# Patient Record
Sex: MALE | Race: WHITE | NOT HISPANIC OR LATINO | Employment: FULL TIME | RURAL
[De-identification: names, ages, dates, MRNs, and addresses within clinical notes are randomized per-mention and may not be internally consistent; named-entity substitution may affect disease eponyms.]

---

## 2022-04-25 ENCOUNTER — OFFICE VISIT (OUTPATIENT)
Dept: FAMILY MEDICINE CLINIC | Facility: CLINIC | Age: 52
End: 2022-04-25

## 2022-04-25 VITALS
HEIGHT: 68 IN | BODY MASS INDEX: 32.34 KG/M2 | SYSTOLIC BLOOD PRESSURE: 138 MMHG | DIASTOLIC BLOOD PRESSURE: 100 MMHG | TEMPERATURE: 98 F | WEIGHT: 213.4 LBS | RESPIRATION RATE: 14 BRPM | OXYGEN SATURATION: 99 % | HEART RATE: 79 BPM

## 2022-04-25 DIAGNOSIS — Z13.220 SCREENING FOR CHOLESTEROL LEVEL: ICD-10-CM

## 2022-04-25 DIAGNOSIS — Z76.89 ENCOUNTER TO ESTABLISH CARE WITH NEW DOCTOR: Primary | ICD-10-CM

## 2022-04-25 DIAGNOSIS — Z82.49 FAMILY HISTORY OF EARLY CAD: ICD-10-CM

## 2022-04-25 DIAGNOSIS — F17.210 CIGARETTE SMOKER: ICD-10-CM

## 2022-04-25 DIAGNOSIS — R03.0 ELEVATED BP WITHOUT DIAGNOSIS OF HYPERTENSION: ICD-10-CM

## 2022-04-25 DIAGNOSIS — Z13.1 SCREENING FOR DIABETES MELLITUS: ICD-10-CM

## 2022-04-25 DIAGNOSIS — M25.551 RIGHT HIP PAIN: ICD-10-CM

## 2022-04-25 DIAGNOSIS — Z13.29 SCREENING FOR THYROID DISORDER: ICD-10-CM

## 2022-04-25 DIAGNOSIS — Z11.59 ENCOUNTER FOR HEPATITIS C SCREENING TEST FOR LOW RISK PATIENT: ICD-10-CM

## 2022-04-25 PROCEDURE — 99204 OFFICE O/P NEW MOD 45 MIN: CPT | Performed by: FAMILY MEDICINE

## 2022-04-25 RX ORDER — BUPROPION HYDROCHLORIDE 150 MG/1
150 TABLET ORAL DAILY
Qty: 30 TABLET | Refills: 12 | Status: SHIPPED | OUTPATIENT
Start: 2022-04-25 | End: 2022-05-27 | Stop reason: SDUPTHER

## 2022-04-25 NOTE — PROGRESS NOTES
"Vicki Rodgers is a 52 y.o. male.     No chief complaint on file.      {The patient is here:98997}.  Last comprehensive physical was on ***.  {Previous Physican 2:60815}  {Overall has :40787}. {Nutrition :35727}. Last tetanus shot was {Date Range:65675}. {Optional Exam Choices:34356}    History of Present Illness     Recent Hospitalizations:  {Hospitalization history:[id number]::\"No hospitalization(s) within the last year.\"}.  ccc      I personally reviewed and updated the patient's allergies, medications, problem list, and past medical, surgical, social, and family history. I have reviewed and confirmed the accuracy of the HPI and ROS as documented by the MA/FLAKITO/ROMEL Helms MA    No family history on file.         No past surgical history on file.    There is no problem list on file for this patient.      No current outpatient medications on file.    Review of Systems    I have reviewed and confirmed the accuracy of the ROS as documented by the MA/FLAKITO/ROMEL Helms MA      Objective   There were no vitals taken for this visit.  BP Readings from Last 3 Encounters:   No data found for BP     Wt Readings from Last 3 Encounters:   No data found for Wt     Physical Exam    Data / Lab Results:    No results found for: HGBA1C     No results found for: LDL, LDLDIRECT  No results found for: CHOL  No results found for: TRIG  No results found for: HDL  No results found for: PSA  No results found for: WBC, HGB, HCT, MCV, PLT  No results found for: TSH, A4GNLWE, O7ECYJS   No results found for: GLUCOSE, BUN, CREATININE, EGFRIFNONA, EGFRIFAFRI, BCR, K, CO2, CALCIUM, PROTENTOTREF, ALBUMIN, LABIL2, BILIRUBIN, AST, ALT  No results found for: KIRSTIN, RF, SEDRATE   No results found for: CRP   No results found for: IRON, TIBC, FERRITIN   No results found for: [id number]     Age-appropriate Screening Schedule:  Refer to the list below for future screening recommendations based on patient's age, sex and/or medical " conditions. Orders for these recommended tests are listed in the plan section. The patient has been provided with a written plan.    Health Maintenance   Topic Date Due   • TDAP/TD VACCINES (1 - Tdap) Never done   • ZOSTER VACCINE (1 of 2) Never done   • INFLUENZA VACCINE  08/01/2022           Assessment/Plan      Medications        Problem List         LOS        There are no diagnoses linked to this encounter.          Expected course, medications, and adverse effects discussed.  Call or return if worsening or persistent symptoms.  I wore protective equipment throughout this patient encounter including a mask, gloves, and eye protection.  Hand hygiene was performed before donning protective equipment and after removal when leaving the room. The complete contents of the Assessment and Plan and Data / Lab Results as documented above have been reviewed and addressed by myself with the patient today as part of an ongoing evaluation / treatment plan.  If some of the documentation has been copied from a previous note and is unchanged it indicates that this problem / plan has been assessed today but is stable from a previous visit and no changes have been recommended.

## 2022-04-25 NOTE — PROGRESS NOTES
"Chief Complaint  Nicotine Dependence     History of Present Illness    Bryan Rodgers presents today to discuss getting help to stop smoking. Patient has been smoking cigarettes for the last 1 1/2 years. Patient is currently smoking 1 pack a day. Patient used to smoke in the past, first started when he was around 27 years of age, and smoked for 15 years. Pt states he got help and was placed on welbutrin and nicorette for 9 weeks and helped him to quit. Patient would like to get back on the medication.     No history of HTN. Was up last night eating pizza. Work 3rd shift    Do want to loose weight and restart exercise, Right hip pain limits activity. Reduced flexability especially in right hip. Work as .     Usually eat healthy and juice.     Do take ibuprofen when needed for hip or other pain.     No history of lung issues or dyspnea no chronic cough.     Dad early CAD, transplant patient,  in MVA    Patient is employed as a  for the past decade.  He retired from the  prior to the age of 42.    Last physical was approximately 2 years ago, have not had a colonoscopy    No current outpatient medications on file prior to visit.     No current facility-administered medications on file prior to visit.       Objective   Vital Signs:   /100 (BP Location: Left arm)   Pulse 79   Temp 98 °F (36.7 °C)   Resp 14   Ht 173 cm (68.11\")   Wt 96.8 kg (213 lb 6.4 oz)   SpO2 99%   BMI 32.34 kg/m²       Physical Exam  Vitals and nursing note reviewed.   Constitutional:       General: He is not in acute distress.     Appearance: Normal appearance. He is well-developed.   HENT:      Head: Normocephalic and atraumatic.   Eyes:      Conjunctiva/sclera: Conjunctivae normal.      Pupils: Pupils are equal, round, and reactive to light.   Neck:      Thyroid: No thyromegaly.      Vascular: No JVD.   Cardiovascular:      Rate and Rhythm: Normal rate and regular rhythm.      Heart sounds: Murmur ( " 1/6 systolic ejection murmur) heard.   Pulmonary:      Breath sounds: Normal breath sounds. No wheezing or rales.   Musculoskeletal:         General: Normal range of motion.      Cervical back: Normal range of motion.      Comments: No tenderness to palpation of low lumbar spine, right SI joint or lateral hip.  There is slightly reduced range of motion with flexion at the hip   Lymphadenopathy:      Cervical: No cervical adenopathy.   Skin:     General: Skin is warm and dry.      Findings: No rash.   Neurological:      Mental Status: He is alert and oriented to person, place, and time.   Psychiatric:         Mood and Affect: Mood normal.         Behavior: Behavior normal.          No visits with results within 1 Day(s) from this visit.   Latest known visit with results is:   No results found for any previous visit.           No results found for: HGBA1C             Assessment and Plan    Diagnoses and all orders for this visit:    1. Encounter to establish care with new doctor (Primary)    2. Cigarette smoker  -     buPROPion XL (WELLBUTRIN XL) 150 MG 24 hr tablet; Take 1 tablet by mouth Daily.  Dispense: 30 tablet; Refill: 12    3. Right hip pain    4. Elevated BP without diagnosis of hypertension  -     Comprehensive Metabolic Panel; Future  -     TSH; Future  -     T4, Free; Future    5. Screening for thyroid disorder  -     TSH; Future  -     T4, Free; Future    6. Screening for cholesterol level  -     Lipid Panel; Future    7. Screening for diabetes mellitus  -     Comprehensive Metabolic Panel; Future    8. Encounter for hepatitis C screening test for low risk patient  -     Hepatitis C Antibody; Future    9. Family history of early CAD      Counseled to quit cigarette use, patient is willing and wants to quit.  Discused Chantix, as well as other methods for reducing cigarette use including reducing by 1 additional cigarette every 3 days.  Encouraged to set stop date.  Handout given on steps to quitting  smoking and advised to use helpline 1 800 quit now to establish a plan for quitting.  Plan to set stop date in 4-5 week.     Elevated blood pressure without a history of hypertension.  Patient does have a family history of early coronary artery disease, father requiring heart transplant following an MI in his 40s.  Patient states his diet has been horrible recently, discussed DASH diet, specifically sodium restriction and increasing fruits and vegetables for added potassium and lean dairy for added calcium as well as weight reduction.  He will start checking blood pressure at home and if readings greater than 130/80 will contact office to consider starting medication, we will have him return soon to ensure blood pressure is not remaining elevated will also screen for cholesterol    Chronic hip pain, advised anti-inflammatories can contribute to elevated blood pressure.  Advised to check before and after dosing of ibuprofen and if elevated do not continue.  Did discuss topicals such as Voltaren gel.  Patient has been taking Osteo Bi-Flex off and on for the past 2 years and has not noticed improvement.  Offered hip x-ray and referral to Ortho, patient declines at this time but likely will want to pursue this in the near future.    There are no discontinued medications.      Follow Up     Return in about 4 weeks (around 5/23/2022) for Annual physical recheck blood pressure, cigarette use, and review labs.    Patient was given instructions and counseling regarding his condition or for health maintenance advice. Please see specific information pulled into the AVS if appropriate.

## 2022-04-25 NOTE — PATIENT INSTRUCTIONS
"https://www.nhlbi.nih.gov/files/docs/public/heart/dash_brief.pdf\">   DASH Eating Plan  DASH stands for Dietary Approaches to Stop Hypertension. The DASH eating plan is a healthy eating plan that has been shown to:  Reduce high blood pressure (hypertension).  Reduce your risk for type 2 diabetes, heart disease, and stroke.  Help with weight loss.  What are tips for following this plan?  Reading food labels  Check food labels for the amount of salt (sodium) per serving. Choose foods with less than 5 percent of the Daily Value of sodium. Generally, foods with less than 300 milligrams (mg) of sodium per serving fit into this eating plan.  To find whole grains, look for the word \"whole\" as the first word in the ingredient list.  Shopping  Buy products labeled as \"low-sodium\" or \"no salt added.\"  Buy fresh foods. Avoid canned foods and pre-made or frozen meals.  Cooking  Avoid adding salt when cooking. Use salt-free seasonings or herbs instead of table salt or sea salt. Check with your health care provider or pharmacist before using salt substitutes.  Do not sloan foods. Cook foods using healthy methods such as baking, boiling, grilling, roasting, and broiling instead.  Cook with heart-healthy oils, such as olive, canola, avocado, soybean, or sunflower oil.  Meal planning    Eat a balanced diet that includes:  4 or more servings of fruits and 4 or more servings of vegetables each day. Try to fill one-half of your plate with fruits and vegetables.  6-8 servings of whole grains each day.  Less than 6 oz (170 g) of lean meat, poultry, or fish each day. A 3-oz (85-g) serving of meat is about the same size as a deck of cards. One egg equals 1 oz (28 g).  2-3 servings of low-fat dairy each day. One serving is 1 cup (237 mL).  1 serving of nuts, seeds, or beans 5 times each week.  2-3 servings of heart-healthy fats. Healthy fats called omega-3 fatty acids are found in foods such as walnuts, flaxseeds, fortified milks, and eggs. " These fats are also found in cold-water fish, such as sardines, salmon, and mackerel.  Limit how much you eat of:  Canned or prepackaged foods.  Food that is high in trans fat, such as some fried foods.  Food that is high in saturated fat, such as fatty meat.  Desserts and other sweets, sugary drinks, and other foods with added sugar.  Full-fat dairy products.  Do not salt foods before eating.  Do not eat more than 4 egg yolks a week.  Try to eat at least 2 vegetarian meals a week.  Eat more home-cooked food and less restaurant, buffet, and fast food.    Lifestyle  When eating at a restaurant, ask that your food be prepared with less salt or no salt, if possible.  If you drink alcohol:  Limit how much you use to:  0-1 drink a day for women who are not pregnant.  0-2 drinks a day for men.  Be aware of how much alcohol is in your drink. In the [place] one drink equals one 12 oz bottle of beer (355 mL), one 5 oz glass of wine (148 mL), or one 1½ oz glass of hard liquor (44 mL).  General information  Avoid eating more than 2,300 mg of salt a day. If you have hypertension, you may need to reduce your sodium intake to 1,500 mg a day.  Work with your health care provider to maintain a healthy body weight or to lose weight. Ask what an ideal weight is for you.  Get at least 30 minutes of exercise that causes your heart to beat faster (aerobic exercise) most days of the week. Activities may include walking, swimming, or biking.  Work with your health care provider or dietitian to adjust your eating plan to your individual calorie needs.  What foods should I eat?  Fruits  All fresh, dried, or frozen fruit. Canned fruit in natural juice (without added sugar).  Vegetables  Fresh or frozen vegetables (raw, steamed, roasted, or grilled). Low-sodium or reduced-sodium tomato and vegetable juice. Low-sodium or reduced-sodium tomato sauce and tomato paste. Low-sodium or reduced-sodium canned vegetables.  Grains  Whole-grain or  whole-wheat bread. Whole-grain or whole-wheat pasta. Brown rice. Oatmeal. Quinoa. Bulgur. Whole-grain and low-sodium cereals. Fifi bread. Low-fat, low-sodium crackers. Whole-wheat flour tortillas.  Meats and other proteins  Skinless chicken or turkey. Ground chicken or turkey. Pork with fat trimmed off. Fish and seafood. Egg whites. Dried beans, peas, or lentils. Unsalted nuts, nut butters, and seeds. Unsalted canned beans. Lean cuts of beef with fat trimmed off. Low-sodium, lean precooked or cured meat, such as sausages or meat loaves.  Dairy  Low-fat (1%) or fat-free (skim) milk. Reduced-fat, low-fat, or fat-free cheeses. Nonfat, low-sodium ricotta or cottage cheese. Low-fat or nonfat yogurt. Low-fat, low-sodium cheese.  Fats and oils  Soft margarine without trans fats. Vegetable oil. Reduced-fat, low-fat, or light mayonnaise and salad dressings (reduced-sodium). Canola, safflower, olive, avocado, soybean, and sunflower oils. Avocado.  Seasonings and condiments  Herbs. Spices. Seasoning mixes without salt.  Other foods  Unsalted popcorn and pretzels. Fat-free sweets.  The items listed above may not be a complete list of foods and beverages you can eat. Contact a dietitian for more information.  What foods should I avoid?  Fruits  Canned fruit in a light or heavy syrup. Fried fruit. Fruit in cream or butter sauce.  Vegetables  Creamed or fried vegetables. Vegetables in a cheese sauce. Regular canned vegetables (not low-sodium or reduced-sodium). Regular canned tomato sauce and paste (not low-sodium or reduced-sodium). Regular tomato and vegetable juice (not low-sodium or reduced-sodium). Pickles. Olives.  Grains  Baked goods made with fat, such as croissants, muffins, or some breads. Dry pasta or rice meal packs.  Meats and other proteins  Fatty cuts of meat. Ribs. Fried meat. Sykes. Bologna, salami, and other precooked or cured meats, such as sausages or meat loaves. Fat from the back of a pig (fatback).  Bratwurst. Salted nuts and seeds. Canned beans with added salt. Canned or smoked fish. Whole eggs or egg yolks. Chicken or turkey with skin.  Dairy  Whole or 2% milk, cream, and half-and-half. Whole or full-fat cream cheese. Whole-fat or sweetened yogurt. Full-fat cheese. Nondairy creamers. Whipped toppings. Processed cheese and cheese spreads.  Fats and oils  Butter. Stick margarine. Lard. Shortening. Ghee. Sykes fat. Tropical oils, such as coconut, palm kernel, or palm oil.  Seasonings and condiments  Onion salt, garlic salt, seasoned salt, table salt, and sea salt. Worcestershire sauce. Tartar sauce. Barbecue sauce. Teriyaki sauce. Soy sauce, including reduced-sodium. Steak sauce. Canned and packaged gravies. Fish sauce. Oyster sauce. Cocktail sauce. Store-bought horseradish. Ketchup. Mustard. Meat flavorings and tenderizers. Bouillon cubes. Hot sauces. Pre-made or packaged marinades. Pre-made or packaged taco seasonings. Relishes. Regular salad dressings.  Other foods  Salted popcorn and pretzels.  The items listed above may not be a complete list of foods and beverages you should avoid. Contact a dietitian for more information.  Where to find more information  National Heart, Lung, and [employer] [place]: www.nhlbi.nih.gov  American Heart Association: www.heart.org  Academy of Nutrition and Dietetics: www.eatright.org  National Kidney Foundation: www.kidney.org  Summary  The DASH eating plan is a healthy eating plan that has been shown to reduce high blood pressure (hypertension). It may also reduce your risk for type 2 diabetes, heart disease, and stroke.  When on the DASH eating plan, aim to eat more fresh fruits and vegetables, whole grains, lean proteins, low-fat dairy, and heart-healthy fats.  With the DASH eating plan, you should limit salt (sodium) intake to 2,300 mg a day. If you have hypertension, you may need to reduce your sodium intake to 1,500 mg a day.  Work with your health care provider or  dietitian to adjust your eating plan to your individual calorie needs.  This information is not intended to replace advice given to you by your health care provider. Make sure you discuss any questions you have with your health care provider.  Document Revised: 11/20/2020 Document Reviewed: 11/20/2020  [employer] Patient Education © 2021 [employer]  Steps to Quit Smoking  Call 3-981-QUIT-NOW for more information    Smoking tobacco can be harmful to your health and can affect almost every organ in your body. Smoking puts you, and those around you, at risk for developing many serious chronic diseases. Quitting smoking is difficult, but it is one of the best things that you can do for your health. It is never too late to quit.  What are the benefits of quitting smoking?  When you quit smoking, you lower your risk of developing serious diseases and conditions, such as:  Lung cancer or lung disease, such as COPD.  Heart disease.  Stroke.  Heart attack.  Infertility.  Osteoporosis and bone fractures.  Additionally, symptoms such as coughing, wheezing, and shortness of breath may get better when you quit. You may also find that you get sick less often because your body is stronger at fighting off colds and infections. If you are pregnant, quitting smoking can help to reduce your chances of having a baby of low birth weight.  How do I get ready to quit?  When you decide to quit smoking, create a plan to make sure that you are successful. Before you quit:  Pick a date to quit. Set a date within the next two weeks to give you time to prepare.  Write down the reasons why you are quitting. Keep this list in places where you will see it often, such as on your bathroom mirror or in your car or wallet.  Identify the people, places, things, and activities that make you want to smoke (triggers) and avoid them. Make sure to take these actions:  Throw away all cigarettes at home, at work, and in your car.  Throw away smoking  accessories, such as ashtrays and lighters.  Clean your car and make sure to empty the ashtray.  Clean your home, including curtains and carpets.  Tell your family, friends, and coworkers that you are quitting. Support from your loved ones can make quitting easier.  Talk with your health care provider about your options for quitting smoking.  Find out what treatment options are covered by your health insurance.  What strategies can I use to quit smoking?  Talk with your healthcare provider about different strategies to quit smoking. Some strategies include:  Quitting smoking altogether instead of gradually lessening how much you smoke over a period of time. Research shows that quitting “cold turkey” is more successful than gradually quitting.  Attending in-person counseling to help you build problem-solving skills. You are more likely to have success in quitting if you attend several counseling sessions. Even short sessions of 10 minutes can be effective.  Finding resources and support systems that can help you to quit smoking and remain smoke-free after you quit. These resources are most helpful when you use them often. They can include:  Online chats with a counselor.  Telephone quitlines.  Printed self-help materials.  Support groups or group counseling.  Text messaging programs.  Mobile phone applications.  Taking medicines to help you quit smoking. (If you are pregnant or breastfeeding, talk with your health care provider first.) Some medicines contain nicotine and some do not. Both types of medicines help with cravings, but the medicines that include nicotine help to relieve withdrawal symptoms. Your health care provider may recommend:  Nicotine patches, gum, or lozenges.  Nicotine inhalers or sprays.  Non-nicotine medicine that is taken by mouth.  Talk with your health care provider about combining strategies, such as taking medicines while you are also receiving in-person counseling. Using these two  strategies together makes you more likely to succeed in quitting than if you used either strategy on its own.  If you are pregnant or breastfeeding, talk with your health care provider about finding counseling or other support strategies to quit smoking. Do not take medicine to help you quit smoking unless told to do so by your health care provider.  What things can I do to make it easier to quit?  Quitting smoking might feel overwhelming at first, but there is a lot that you can do to make it easier. Take these important actions:  Reach out to your family and friends and ask that they support and encourage you during this time. Call telephone quitlines, reach out to support groups, or work with a counselor for support.  Ask people who smoke to avoid smoking around you.  Avoid places that trigger you to smoke, such as bars, parties, or smoke-break areas at work.  Spend time around people who do not smoke.  Lessen stress in your life, because stress can be a smoking trigger for some people. To lessen stress, try:  Exercising regularly.  Deep-breathing exercises.  Yoga.  Meditating.  Performing a body scan. This involves closing your eyes, scanning your body from head to toe, and noticing which parts of your body are particularly tense. Purposefully relax the muscles in those areas.  Download or purchase mobile phone or tablet apps (applications) that can help you stick to your quit plan by providing reminders, tips, and encouragement. There are many free apps, such as QuitGuide from the CDC (Centers for Disease Control and Prevention). You can find other support for quitting smoking (smoking cessation) through smokefree.gov and other websites.  How will I feel when I quit smoking?  Within the first 24 hours of quitting smoking, you may start to feel some withdrawal symptoms. These symptoms are usually most noticeable 2-3 days after quitting, but they usually do not last beyond 2-3 weeks. Changes or symptoms that you  might experience include:  Mood swings.  Restlessness, anxiety, or irritation.  Difficulty concentrating.  Dizziness.  Strong cravings for sugary foods in addition to nicotine.  Mild weight gain.  Constipation.  Nausea.  Coughing or a sore throat.  Changes in how your medicines work in your body.  A depressed mood.  Difficulty sleeping (insomnia).  After the first 2-3 weeks of quitting, you may start to notice more positive results, such as:  Improved sense of smell and taste.  Decreased coughing and sore throat.  Slower heart rate.  Lower blood pressure.  Clearer skin.  The ability to breathe more easily.  Fewer sick days.  Quitting smoking is very challenging for most people. Do not get discouraged if you are not successful the first time. Some people need to make many attempts to quit before they achieve long-term success. Do your best to stick to your quit plan, and talk with your health care provider if you have any questions or concerns.  This information is not intended to replace advice given to you by your health care provider. Make sure you discuss any questions you have with your health care provider.  Document Released: 12/12/2002 Document Revised: 07/24/2018 Document Reviewed: 05/03/2016  [employer] Interactive Patient Education © 2020 [employer]

## 2022-04-29 ENCOUNTER — PATIENT ROUNDING (BHMG ONLY) (OUTPATIENT)
Dept: FAMILY MEDICINE CLINIC | Facility: CLINIC | Age: 52
End: 2022-04-29

## 2022-04-29 NOTE — PROGRESS NOTES
April 29, 2022    Voicemail left. Tonny, My name is Lilo Dennis  , and I am calling your from    Formerly [facility], [facility]  I wanted to welcome you to our practice and ensure that your first visit went smoothly. If you have any question or concerns, feel free to reach out to me directly at [phone]. Thank you and have a great day!

## 2022-05-27 DIAGNOSIS — F17.210 CIGARETTE SMOKER: ICD-10-CM

## 2022-05-27 RX ORDER — BUPROPION HYDROCHLORIDE 150 MG/1
150 TABLET ORAL DAILY
Qty: 30 TABLET | Refills: 12 | Status: SHIPPED | OUTPATIENT
Start: 2022-05-27 | End: 2022-05-27

## 2022-05-27 RX ORDER — BUPROPION HYDROCHLORIDE 150 MG/1
150 TABLET ORAL DAILY
Qty: 30 TABLET | Refills: 12 | Status: SHIPPED | OUTPATIENT
Start: 2022-05-27 | End: 2022-06-06 | Stop reason: SDUPTHER

## 2022-05-31 ENCOUNTER — TELEPHONE (OUTPATIENT)
Dept: FAMILY MEDICINE CLINIC | Facility: CLINIC | Age: 52
End: 2022-05-31

## 2022-06-06 ENCOUNTER — TELEPHONE (OUTPATIENT)
Dept: FAMILY MEDICINE CLINIC | Facility: CLINIC | Age: 52
End: 2022-06-06

## 2022-06-06 ENCOUNTER — OFFICE VISIT (OUTPATIENT)
Dept: FAMILY MEDICINE CLINIC | Facility: CLINIC | Age: 52
End: 2022-06-06

## 2022-06-06 VITALS
HEART RATE: 72 BPM | TEMPERATURE: 98 F | OXYGEN SATURATION: 97 % | RESPIRATION RATE: 18 BRPM | SYSTOLIC BLOOD PRESSURE: 122 MMHG | BODY MASS INDEX: 31.52 KG/M2 | WEIGHT: 208 LBS | HEIGHT: 68 IN | DIASTOLIC BLOOD PRESSURE: 78 MMHG

## 2022-06-06 DIAGNOSIS — F90.9 ADULT ADHD (ATTENTION DEFICIT HYPERACTIVITY DISORDER): ICD-10-CM

## 2022-06-06 DIAGNOSIS — F17.210 CIGARETTE SMOKER: ICD-10-CM

## 2022-06-06 DIAGNOSIS — M25.551 RIGHT HIP PAIN: Primary | ICD-10-CM

## 2022-06-06 PROCEDURE — 99406 BEHAV CHNG SMOKING 3-10 MIN: CPT | Performed by: FAMILY MEDICINE

## 2022-06-06 PROCEDURE — 99214 OFFICE O/P EST MOD 30 MIN: CPT | Performed by: FAMILY MEDICINE

## 2022-06-06 RX ORDER — DEXTROAMPHETAMINE SACCHARATE, AMPHETAMINE ASPARTATE MONOHYDRATE, DEXTROAMPHETAMINE SULFATE AND AMPHETAMINE SULFATE 5; 5; 5; 5 MG/1; MG/1; MG/1; MG/1
20 CAPSULE, EXTENDED RELEASE ORAL EVERY MORNING
Qty: 30 CAPSULE | Refills: 0 | Status: SHIPPED | OUTPATIENT
Start: 2022-06-06 | End: 2022-07-06 | Stop reason: SDUPTHER

## 2022-06-06 RX ORDER — BUPROPION HYDROCHLORIDE 300 MG/1
300 TABLET ORAL DAILY
Qty: 30 TABLET | Refills: 2 | Status: SHIPPED | OUTPATIENT
Start: 2022-06-06 | End: 2022-08-02

## 2022-06-06 RX ORDER — DEXTROAMPHETAMINE SACCHARATE, AMPHETAMINE ASPARTATE, DEXTROAMPHETAMINE SULFATE AND AMPHETAMINE SULFATE 2.5; 2.5; 2.5; 2.5 MG/1; MG/1; MG/1; MG/1
10 TABLET ORAL DAILY
Refills: 0 | Status: CANCELLED | OUTPATIENT
Start: 2022-06-06

## 2022-06-06 NOTE — PROGRESS NOTES
"Chief Complaint  Hip Pain and ADHD     History of Present Illness  Bryan Rodgers presents today for follow up on labs, discuss getting right hip xray, and requesting to start adderall.      Pt states he has right hip pain, suspects it maybe arthritis. The pain has been present for the last 2 years. Pt has tried ibuprofen for pain relief and states it helps some. Take 2-4 tablets about every 3 days when pain is flared up. Pain with movement but occasionally when laying in bed.   Im  15 years and did martial arts as child.   Pt states he use to use adderall in the past to keep his ADHD under control and would like to get back on it with his insurance he has now.     Did start Wellbutrin, not covered by insurance, getting through good Rx  Have reduced from 1 to 1/2 ppd.   Have not noticed difference in concentration. Lack of motivation to finish projects. Forget what doing.       Current Outpatient Medications on File Prior to Visit   Medication Sig   • [DISCONTINUED] buPROPion XL (WELLBUTRIN XL) 150 MG 24 hr tablet Take 1 tablet by mouth Daily.     No current facility-administered medications on file prior to visit.       Objective   Vital Signs:   /78   Pulse 72   Temp 98 °F (36.7 °C)   Resp 18   Ht 173 cm (68.11\")   Wt 94.3 kg (208 lb)   SpO2 97%   BMI 31.52 kg/m²       Physical Exam  Vitals and nursing note reviewed.   Constitutional:       General: He is not in acute distress.     Appearance: Normal appearance. He is well-developed.   HENT:      Head: Normocephalic and atraumatic.   Eyes:      Conjunctiva/sclera: Conjunctivae normal.      Pupils: Pupils are equal, round, and reactive to light.   Neck:      Thyroid: No thyromegaly.      Vascular: No JVD.   Cardiovascular:      Rate and Rhythm: Normal rate and regular rhythm.      Heart sounds: Normal heart sounds. No murmur heard.  Pulmonary:      Breath sounds: Normal breath sounds. No wheezing or rales.   Musculoskeletal:         General: Normal " range of motion.      Cervical back: Normal range of motion.      Comments: No tenderness to palpation of low lumbar spine, right SI joint or lateral hip.  There is slightly reduced range of motion with forward flexion but not abduction at the hip   Lymphadenopathy:      Cervical: No cervical adenopathy.   Skin:     General: Skin is warm and dry.      Findings: No rash.   Neurological:      Mental Status: He is alert and oriented to person, place, and time.   Psychiatric:         Mood and Affect: Mood normal.         Behavior: Behavior normal.            No visits with results within 1 Day(s) from this visit.   Latest known visit with results is:   Results Encounter on 05/09/2022   Component Date Value Ref Range Status   • Total Cholesterol 05/10/2022 183  100 - 199 mg/dL Final   • Triglycerides 05/10/2022 120  0 - 149 mg/dL Final   • HDL Cholesterol 05/10/2022 42  >39 mg/dL Final   • VLDL Cholesterol Jaylen 05/10/2022 22  5 - 40 mg/dL Final   • LDL Chol Calc ([facility]) 05/10/2022 119 (A) 0 - 99 mg/dL Final   • Glucose 05/10/2022 94  65 - 99 mg/dL Final   • BUN 05/10/2022 14  6 - 24 mg/dL Final   • Creatinine 05/10/2022 1.26  0.76 - 1.27 mg/dL Final   • EGFR Result 05/10/2022 69  >59 mL/min/1.73 Final   • BUN/Creatinine Ratio 05/10/2022 11  9 - 20 Final   • Sodium 05/10/2022 140  134 - 144 mmol/L Final   • Potassium 05/10/2022 4.7  3.5 - 5.2 mmol/L Final   • Chloride 05/10/2022 100  96 - 106 mmol/L Final   • Total CO2 05/10/2022 24  20 - 29 mmol/L Final   • Calcium 05/10/2022 9.9  8.7 - 10.2 mg/dL Final   • Total Protein 05/10/2022 7.7  6.0 - 8.5 g/dL Final   • Albumin 05/10/2022 4.9  4.0 - 5.0 g/dL Final   • Globulin 05/10/2022 2.8  1.5 - 4.5 g/dL Final   • A/G Ratio 05/10/2022 1.8  1.2 - 2.2 Final   • Total Bilirubin 05/10/2022 1.0  0.0 - 1.2 mg/dL Final   • Alkaline Phosphatase 05/10/2022 66  44 - 121 IU/L Final   • AST (SGOT) 05/10/2022 28  0 - 40 IU/L Final   • ALT (SGPT) 05/10/2022 32  0 - 44 IU/L Final   • Hep C  Virus Ab 05/10/2022 0.1  0.0 - 0.9 s/co ratio Final    Comment:                                   Negative:     < 0.8                               Indeterminate: 0.8 - 0.9                                    Positive:     > 0.9   The CDC recommends that a positive HCV antibody result   be followed up with a HCV Nucleic Acid Amplification   test ([id number]).     • TSH 05/10/2022 3.340  0.450 - 4.500 uIU/mL Final   • Free T4 05/10/2022 1.28  0.82 - 1.77 ng/dL Final       Lab Results   Component Value Date    BUN 14 05/10/2022    CREATININE 1.26 05/10/2022     05/10/2022    K 4.7 05/10/2022     05/10/2022    CALCIUM 9.9 05/10/2022    ALBUMIN 4.9 05/10/2022    BILITOT 1.0 05/10/2022    ALKPHOS 66 05/10/2022    AST 28 05/10/2022    ALT 32 05/10/2022    CHLPL 183 05/10/2022    TRIG 120 05/10/2022    HDL 42 05/10/2022    VLDL 22 05/10/2022     (H) 05/10/2022    TSH 3.340 05/10/2022    FREET4 1.28 05/10/2022        No results found for: HGBA1C             Assessment and Plan    Diagnoses and all orders for this visit:    1. Right hip pain (Primary)  -     XR Hip With or Without Pelvis 2 - 3 View Right; Future    2. Adult ADHD (attention deficit hyperactivity disorder)  -     amphetamine-dextroamphetamine XR (Adderall XR) 20 MG 24 hr capsule; Take 1 capsule by mouth Every Morning  Dispense: 30 capsule; Refill: 0    3. Cigarette smoker  -     buPROPion XL (WELLBUTRIN XL) 300 MG 24 hr tablet; Take 1 tablet by mouth Daily.  Dispense: 30 tablet; Refill: 2      Intermittent right hip pain, x-ray to evaluate for possible arthritis.  He is currently taking Advil 400 to 800 mg averaging about every 3 days without side effects, will continue as needed use.    Cigarette use, cutting back insurance has not approved Wellbutrin.  Patient is willing to continue good Rx program, increasing to 300 mg daily.  Counseled to quit cigarette use, patient is willing and wants to quit.  Discussed Chantix, as well as other methods  for reducing cigarette use including reducing by 1 additional cigarette every 3 days.  Encouraged to set stop date.  He is setting date as July 1.  Handout given on steps to quitting smoking and advised to use helpline 1 800 quit now to establish a plan for quitting.  Spent 3 minutes of appointment and counseling for cigarette cessation    ADHD, adult ADHD self report scale symptom checklist completed and reviewed.  Consistent with attention deficit disorder with hyperactivity.  Increasing Wellbutrin and starting Adderall XR 20 mg daily    Medications Discontinued During This Encounter   Medication Reason   • buPROPion XL (WELLBUTRIN XL) 150 MG 24 hr tablet Reorder         Follow Up     Return in about 4 weeks (around 7/4/2022) for Recheck adhd.    Patient was given instructions and counseling regarding his condition or for health maintenance advice. Please see specific information pulled into the AVS if appropriate.

## 2022-06-08 ENCOUNTER — HOSPITAL ENCOUNTER (OUTPATIENT)
Dept: GENERAL RADIOLOGY | Facility: HOSPITAL | Age: 52
Discharge: HOME | End: 2022-06-08
Admitting: FAMILY MEDICINE

## 2022-06-08 DIAGNOSIS — M25.551 RIGHT HIP PAIN: ICD-10-CM

## 2022-06-08 PROCEDURE — 73502 X-RAY EXAM HIP UNI 2-3 VIEWS: CPT

## 2022-06-13 DIAGNOSIS — M25.551 RIGHT HIP PAIN: Primary | ICD-10-CM

## 2022-06-13 DIAGNOSIS — M16.11 OSTEOARTHRITIS OF RIGHT HIP, UNSPECIFIED OSTEOARTHRITIS TYPE: ICD-10-CM

## 2022-06-14 ENCOUNTER — TELEPHONE (OUTPATIENT)
Dept: FAMILY MEDICINE CLINIC | Facility: CLINIC | Age: 52
End: 2022-06-14

## 2022-07-06 DIAGNOSIS — F90.9 ADULT ADHD (ATTENTION DEFICIT HYPERACTIVITY DISORDER): ICD-10-CM

## 2022-07-06 RX ORDER — DEXTROAMPHETAMINE SACCHARATE, AMPHETAMINE ASPARTATE MONOHYDRATE, DEXTROAMPHETAMINE SULFATE AND AMPHETAMINE SULFATE 5; 5; 5; 5 MG/1; MG/1; MG/1; MG/1
20 CAPSULE, EXTENDED RELEASE ORAL EVERY MORNING
Qty: 30 CAPSULE | Refills: 0 | Status: SHIPPED | OUTPATIENT
Start: 2022-07-06 | End: 2022-08-02 | Stop reason: DRUGHIGH

## 2022-07-06 NOTE — TELEPHONE ENCOUNTER
Looks like he canceled appointment yesterday I will renew 1 time but he will need appointment prior to additional refills.  Does appear he is scheduled with me in 1 month.

## 2022-08-01 PROBLEM — F90.9 ADHD (ATTENTION DEFICIT HYPERACTIVITY DISORDER): Status: ACTIVE | Noted: 2022-08-01

## 2022-08-01 PROBLEM — E66.09 CLASS 1 OBESITY DUE TO EXCESS CALORIES WITHOUT SERIOUS COMORBIDITY WITH BODY MASS INDEX (BMI) OF 31.0 TO 31.9 IN ADULT: Status: ACTIVE | Noted: 2022-08-01

## 2022-08-01 PROBLEM — E66.811 CLASS 1 OBESITY DUE TO EXCESS CALORIES WITHOUT SERIOUS COMORBIDITY WITH BODY MASS INDEX (BMI) OF 31.0 TO 31.9 IN ADULT: Status: ACTIVE | Noted: 2022-08-01

## 2022-08-01 NOTE — PROGRESS NOTES
"Chief Complaint  ADHD  History of Present Illness    Bryan Rodgers presents today for follow up on ADHD.  ADHD:  Symptoms include inattention, forgetfulness, losing things, fidgeting and interrupting others. The symptoms occur at work and during social events. The symptoms are described as Moderate in severity. The symptoms are described as unchanged. Symptoms are exacerbated by work environment. Symptoms are relieved by stimulant medication. Associated symptoms include memory disorder. Current treatment includes dextroamphetamine/amphetamine (Adderall). By report, Bryan is compliant all of the time. Bryan states he feels the medicine works for him most days, however he has days he does not notice it is helping. Bryan also works 3rd shift 10pm-7am. He takes his adderral at around 4-6pm.     Will procrastinate or struggle to find things possibly just overlooking items.     Bryan also reports he stopped the Wellbutrin since he was able to stop smoking July July 7th.    Quit taking Welbutrin yesterday, caused gas and stomach upset and food cravings.     Work night shift for 7 years.     Do see Shivani wilson on 8/5/22, for intermitant hip pain.       No current outpatient medications on file prior to visit.     No current facility-administered medications on file prior to visit.       Objective   Vital Signs:   /78   Pulse 76   Temp 97.6 °F (36.4 °C)   Resp 18   Ht 172.7 cm (68\")   Wt 97 kg (213 lb 12.8 oz)   SpO2 98%   BMI 32.51 kg/m²       Physical Exam  Vitals and nursing note reviewed.   Constitutional:       General: He is not in acute distress.     Appearance: He is well-developed.   HENT:      Head: Normocephalic and atraumatic.   Eyes:      Pupils: Pupils are equal, round, and reactive to light.   Cardiovascular:      Rate and Rhythm: Regular rhythm.      Heart sounds: No murmur heard.  Pulmonary:      Breath sounds: Normal breath sounds. No wheezing or rales.   Skin:     General: Skin is warm and dry.      " Findings: No rash.   Neurological:      Mental Status: He is alert and oriented to person, place, and time.   Psychiatric:         Mood and Affect: Mood normal.         Behavior: Behavior normal.         Thought Content: Thought content normal.         Judgment: Judgment normal.            No visits with results within 1 Day(s) from this visit.   Latest known visit with results is:   Results Encounter on 05/09/2022   Component Date Value Ref Range Status   • Total Cholesterol 05/10/2022 183  100 - 199 mg/dL Final   • Triglycerides 05/10/2022 120  0 - 149 mg/dL Final   • HDL Cholesterol 05/10/2022 42  >39 mg/dL Final   • VLDL Cholesterol Jaylen 05/10/2022 22  5 - 40 mg/dL Final   • LDL Chol Calc ([facility]) 05/10/2022 119 (A) 0 - 99 mg/dL Final   • Glucose 05/10/2022 94  65 - 99 mg/dL Final   • BUN 05/10/2022 14  6 - 24 mg/dL Final   • Creatinine 05/10/2022 1.26  0.76 - 1.27 mg/dL Final   • EGFR Result 05/10/2022 69  >59 mL/min/1.73 Final   • BUN/Creatinine Ratio 05/10/2022 11  9 - 20 Final   • Sodium 05/10/2022 140  134 - 144 mmol/L Final   • Potassium 05/10/2022 4.7  3.5 - 5.2 mmol/L Final   • Chloride 05/10/2022 100  96 - 106 mmol/L Final   • Total CO2 05/10/2022 24  20 - 29 mmol/L Final   • Calcium 05/10/2022 9.9  8.7 - 10.2 mg/dL Final   • Total Protein 05/10/2022 7.7  6.0 - 8.5 g/dL Final   • Albumin 05/10/2022 4.9  4.0 - 5.0 g/dL Final   • Globulin 05/10/2022 2.8  1.5 - 4.5 g/dL Final   • A/G Ratio 05/10/2022 1.8  1.2 - 2.2 Final   • Total Bilirubin 05/10/2022 1.0  0.0 - 1.2 mg/dL Final   • Alkaline Phosphatase 05/10/2022 66  44 - 121 IU/L Final   • AST (SGOT) 05/10/2022 28  0 - 40 IU/L Final   • ALT (SGPT) 05/10/2022 32  0 - 44 IU/L Final   • Hep C Virus Ab 05/10/2022 0.1  0.0 - 0.9 s/co ratio Final    Comment:                                   Negative:     < 0.8                               Indeterminate: 0.8 - 0.9                                    Positive:     > 0.9   The CDC recommends that a positive HCV  antibody result   be followed up with a HCV Nucleic Acid Amplification   test ([id number]).     • TSH 05/10/2022 3.340  0.450 - 4.500 uIU/mL Final   • Free T4 05/10/2022 1.28  0.82 - 1.77 ng/dL Final       Lab Results   Component Value Date    BUN 14 05/10/2022    CREATININE 1.26 05/10/2022     05/10/2022    K 4.7 05/10/2022     05/10/2022    CALCIUM 9.9 05/10/2022    ALBUMIN 4.9 05/10/2022    BILITOT 1.0 05/10/2022    ALKPHOS 66 05/10/2022    AST 28 05/10/2022    ALT 32 05/10/2022    CHLPL 183 05/10/2022    TRIG 120 05/10/2022    HDL 42 05/10/2022    VLDL 22 05/10/2022     (H) 05/10/2022    TSH 3.340 05/10/2022    FREET4 1.28 05/10/2022        No results found for: HGBA1C        Study Result    Narrative & Impression   DATE OF EXAM:  6/8/2022 2:58 PM     PROCEDURE:  XR HIP W OR WO PELVIS 2-3 VIEW RIGHT-     INDICATIONS:  Intermittent right hip pain; M25.551-Pain in right hip     COMPARISON:  No Comparisons Available     TECHNIQUE:   AP and Lateral views of the right hip were obtained.     FINDINGS:  There is no fracture or dislocation. There is moderate to severe right  hip osteoarthritis. There is abnormal configuration of the femoral head  neck junction consistent with CAM-type deformity. Included portions of  the pelvis are intact.      IMPRESSION:  1. Negative for fracture.  2. 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     Electronically Signed By-Gregory Ayala MD On:6/8/2022 3:56 PM  This report was finalized on [id number] by  Gregory Ayala MD.          Assessment and Plan    Diagnoses and all orders for this visit:    1. Adult ADHD (attention deficit hyperactivity disorder) (Primary)  -     amphetamine-dextroamphetamine XR (Adderall XR) 30 MG 24 hr capsule; Take 1 capsule by mouth Every Morning  Dispense: 30 capsule; Refill: 0    2. Osteoarthritis of right hip, unspecified osteoarthritis type  Comments:  CAM deformity       3. Right hip  pain    4. Cigarette smoker    5. Class 1 obesity due to excess calories without serious comorbidity with body mass index (BMI) of 32.0 to 32.9 in adult  Assessment & Plan:  Patient's (Body mass index is 32.51 kg/m².) indicates that they are obese (BMI >30) with health conditions that include osteoarthritis and Hip pain . Weight is worsening. BMI is is above average; BMI management plan is completed. We discussed low calorie, low carb based diet program, portion control, increasing exercise and Adderall was prescribed for ADHD.       ADHD improved somewhat with Adderall increasing from 20 to 30 mg daily.  Patient has self DC'd Wellbutrin as it is causing more side effects than benefit.    He will keep appointment with Dr. Parrish as scheduled on August 5 for evaluation of right hip arthritis and cam deformity.    Counseled to quit cigarette use, patient not ready at this time, advised when ready medication and counseling assistance is available, contact office for assistance and use helpline 1 [phone] quit now to establish a plan for quitting.      Medications Discontinued During This Encounter   Medication Reason   • buPROPion XL (WELLBUTRIN XL) 300 MG 24 hr tablet *Therapy completed   • amphetamine-dextroamphetamine XR (Adderall XR) 20 MG 24 hr capsule Dose adjustment         Follow Up     Return in about 4 weeks (around 8/30/2022) for Annual physical and follow up ADHD.    Patient was given instructions and counseling regarding his condition or for health maintenance advice. Please see specific information pulled into the AVS if appropriate.

## 2022-08-02 ENCOUNTER — OFFICE VISIT (OUTPATIENT)
Dept: FAMILY MEDICINE CLINIC | Facility: CLINIC | Age: 52
End: 2022-08-02

## 2022-08-02 VITALS
SYSTOLIC BLOOD PRESSURE: 138 MMHG | DIASTOLIC BLOOD PRESSURE: 78 MMHG | OXYGEN SATURATION: 98 % | TEMPERATURE: 97.6 F | BODY MASS INDEX: 32.4 KG/M2 | HEIGHT: 68 IN | WEIGHT: 213.8 LBS | RESPIRATION RATE: 18 BRPM | HEART RATE: 76 BPM

## 2022-08-02 DIAGNOSIS — F17.210 CIGARETTE SMOKER: ICD-10-CM

## 2022-08-02 DIAGNOSIS — M16.11 OSTEOARTHRITIS OF RIGHT HIP, UNSPECIFIED OSTEOARTHRITIS TYPE: ICD-10-CM

## 2022-08-02 DIAGNOSIS — M25.551 RIGHT HIP PAIN: ICD-10-CM

## 2022-08-02 DIAGNOSIS — F90.9 ADULT ADHD (ATTENTION DEFICIT HYPERACTIVITY DISORDER): Primary | ICD-10-CM

## 2022-08-02 DIAGNOSIS — E66.09 CLASS 1 OBESITY DUE TO EXCESS CALORIES WITHOUT SERIOUS COMORBIDITY WITH BODY MASS INDEX (BMI) OF 32.0 TO 32.9 IN ADULT: ICD-10-CM

## 2022-08-02 PROCEDURE — 99214 OFFICE O/P EST MOD 30 MIN: CPT | Performed by: FAMILY MEDICINE

## 2022-08-02 RX ORDER — DEXTROAMPHETAMINE SACCHARATE, AMPHETAMINE ASPARTATE MONOHYDRATE, DEXTROAMPHETAMINE SULFATE AND AMPHETAMINE SULFATE 7.5; 7.5; 7.5; 7.5 MG/1; MG/1; MG/1; MG/1
30 CAPSULE, EXTENDED RELEASE ORAL EVERY MORNING
Qty: 30 CAPSULE | Refills: 0 | Status: SHIPPED | OUTPATIENT
Start: 2022-08-02 | End: 2022-08-30 | Stop reason: DRUGHIGH

## 2022-08-05 ENCOUNTER — OFFICE VISIT (OUTPATIENT)
Dept: ORTHOPEDIC SURGERY | Facility: CLINIC | Age: 52
End: 2022-08-05

## 2022-08-05 VITALS — BODY MASS INDEX: 32.28 KG/M2 | WEIGHT: 213 LBS | HEART RATE: 84 BPM | HEIGHT: 68 IN

## 2022-08-05 DIAGNOSIS — M16.11 PRIMARY OSTEOARTHRITIS OF RIGHT HIP: Primary | ICD-10-CM

## 2022-08-05 PROCEDURE — 99204 OFFICE O/P NEW MOD 45 MIN: CPT | Performed by: ORTHOPAEDIC SURGERY

## 2022-08-05 NOTE — PROGRESS NOTES
Chief Complaint  Pain of the Right Hip and Initial Evaluation (PCP with EDIE Tafoya 8/2/2022)    Subjective    History of Present Illness      Bryan Rodgers is a 52 y.o. male who presents to [facility] for significant right hip pain and discomfort.  History of Present Illness   The patient presents to the office with increasing pain and discomfort in his right hip. Her symptoms have been persistent for approximately 4 years, and has associated pain in his groin. His pain is exacerbated with physical activity. 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 He is ascending and descending ladders and scaffolds at work, and has to get into tight spaces and tight corners. The patient adds his pain is increased at the end of a working week. His symptoms are intermittent in nature.      He denies any prior history of trauma to the hip. There are no risk factors for avascular necrosis. He takes anti-inflammatory medication, with mild improvement. The patient notes he will have a duration of consistent pain for 1 or 2 weeks, and then has alleviated pain for 1 or 2 weeks. His pain is classic in the groin, and associated with radiation to the proximal femur. He denies any significant discomfort over the greater trochanteric bursa. He has increased pain with putting on his shoes and socks with his hip in full flexion.     Pain Location: Right groin and hip  Radiation: from the proximal femur into the thigh up to the knee joint  Quality: sharp, stabbing  Intensity/Severity: moderate severe  Duration: 4 years  Progression of symptoms: yes, progressive worsening  Onset quality: gradual   Timing: intermittent  Aggravating Factors: deep flexion of the hip  Alleviating Factors: NSAIDs  Previous Episodes: yes  Associated Symptoms: pain, clicking/popping  ADLs Affected: yes  Previous Treatment: NSAIDs       Objective   Vital  "Signs:   Pulse 84   Ht 172.7 cm (68\")   Wt 96.6 kg (213 lb)   BMI 32.39 kg/m²     Physical Exam  Physical Exam  Vitals signs and nursing note reviewed.   Constitutional:       Appearance: Normal appearance.   Pulmonary:      Effort: Pulmonary effort is normal.   Skin:     General: Skin is warm and dry.      Capillary Refill: Capillary refill takes less than 2 seconds.   Neurological:      General: No focal deficit present.      Mental Status: He is alert and oriented to person, place, and time. Mental status is at baseline.   Psychiatric:         Mood and Affect: Mood normal.         Behavior: Behavior normal.         Thought Content: Thought content normal.         Judgment: Judgment normal.     Ortho Exam   Right hip (djd): Neurovascular status is intact. Patient does have a limp on the affected side. Internal and external rotations are associated with pain and discomfort. Anterior joint line pain and tenderness is significant. Stinchfield sign is positive. Figure of 4 sign is positive. Patient is unable to perform an active straight leg raise exam. Greater trochanter is tender. Crossover adduction test is positive. Cross body adduction is limited and painful for the patient. Patient has very significant limp and joint line tenderness anteriorly, posteriorly and medially. Dorsalis pedis and posterior tibial artery pulses are palpable. Common peroneal nerve function is well preserved.               Result Review :   The following data was reviewed by: Flo Parrish MD on 08/05/2022:        AP and lateral x-rays of the right hip obtained at Dr. Tafoya's office are reviewed. These show significant narrowing of the joint space. There is irregularity of the femoral head. Subchondral cyst formation is noted both over the acetabulum as well as over the lateral aspect of the femoral head. The joint space is concentrically reduced. Changes are consistent with moderately advanced osteoarthritis of the " hip.      Procedures           Assessment   Assessment and Plan    Diagnoses and all orders for this visit:    1. Primary osteoarthritis of right hip (Primary)          Follow Up   ·   · Rest, ice, compression, and elevation (RICE) therapy  · Stretching and strengthening exercises  · OTC Tylenol 500-1000mg by mouth every 6 hours as needed for pain   · Follow up in 12 month(s)  • Patient was given instructions and counseling regarding his condition or for health maintenance advice. Please see specific information pulled into the AVS if appropriate.   • Extensive discussion with the patient today in the office to the extent of 45 minutes regarding different treatment options.   • At this point, we would like to treat him with conservative nonoperative management.  • Tablet meloxicam 7.5 mg tab 1 PO BID for pain, swelling, and discomfort.  • Activity modification including not jumping from 3 to 4 foot off the ground and generally being less aggressive on the knee and the hip.  • Discussed with the patient regarding the importance of weight loss issues as well, and it would be ideal for him to have a body weight of about 190 pounds. Discussed with him about reducing portion sizes, healthy eating, controlled carbohydrate intake, and regular exercise program.  • Extensive counseling about lifestyle changes including going to the gym regularly and utilizing water aerobics as a program for minimizing the impact of the hip, and continuing with cardiovascular status improvement.  • Discussed with the patient the risks and benefits of a total hip arthroplasty and demonstrated the model for total hip arthroplasty. The patient states that he would prefer to be treated conservatively and nonoperatively.   • Discussed with the patient about referral to Dr. Rubio for an intra-articular injection to the hip joint under ultrasound guidance which would help minimize his severe pain and discomfort. The adverse effects of injected  steroids on the intra-articular structure are discussed with the patient.    Flo Parrish MD   Date of Encounter: 8/5/2022   Electronically signed by Flo Parrish MD, 08/05/22, 8:24 AM EDT.     EMR Dragon/Transcription disclaimer:  Much of this encounter note is an electronic transcription/translation of spoken language to printed text. The electronic translation of spoken language may permit erroneous, or at times, nonsensical words or phrases to be inadvertently transcribed; Although I have reviewed the note for such errors, some may still exist.     Transcribed from ambient dictation for Flo Parrish MD by Rah Moore.  08/05/22   09:30 EDT    Patient verbalized consent to the visit recording.  I have personally performed the services described in this document as transcribed by the above individual, and it is both accurate and complete.  Flo Parrish MD  8/5/2022  10:40 EDT

## 2022-08-07 NOTE — ASSESSMENT & PLAN NOTE
Patient's (Body mass index is 32.51 kg/m².) indicates that they are obese (BMI >30) with health conditions that include osteoarthritis and Hip pain . Weight is worsening. BMI is is above average; BMI management plan is completed. We discussed low calorie, low carb based diet program, portion control, increasing exercise and Adderall was prescribed for ADHD.

## 2022-08-29 NOTE — PROGRESS NOTES
Subjective   Bryan Rodgers is a 52 y.o. male.     Chief Complaint   Patient presents with   • Annual Exam   And follow-up on ADHD  History of Present Illness   The patient is here: to discuss health maintenance and disease prevention.  Last comprehensive physical was approximately  2 years ago.  Patient's previous physician was Dr.Shaun mcbride.  Overall has: moderate activity with work/home activities, good appetite, feels well with no complaints, good energy level and is sleeping well. Nutrition: balanced diet and eating a variety of foods. Last tetanus shot was >10 years ago.      Bryan seen  for his hip pain. They discussed hip injections and possible hip replacement in 5 years.    ADHD, symptoms significantly improved, did increase Adderall XR from 20 to 30 mg 1 month ago and he has noticed an improvement in concentration, completing projects, not losing things, and even increased ability to have comfortable conversation.    Recent Hospitalizations:  No hospitalization(s) within the last year.  ccc      I personally reviewed and updated the patient's allergies, medications, problem list, and past medical, surgical, social, and family history. I have reviewed and confirmed the accuracy of the HPI and ROS as documented by the MA/LPN/RN Esther Tafoya MD    Family History   Problem Relation Age of Onset   • Cancer Mother         Breast cancer   • Other Father         CHF, heart transplant   • Cancer Maternal Uncle         esophageal cancer   • Cancer Paternal Aunt         liver cancer   • Other Maternal Grandmother         glaucoma   • Cancer Paternal Grandmother        Social History     Tobacco Use   • Smoking status: Former Smoker     Packs/day: 1.00     Years: 10.00     Pack years: 10.00     Types: Cigarettes     Start date:      Quit date:      Years since quittin.6   • Smokeless tobacco: Never Used   • Tobacco comment: has quit several times. only smoked from 2591-2782 and then  "started 2020 until 2022.   Vaping Use   • Vaping Use: Never used   Substance Use Topics   • Alcohol use: Yes     Comment: occasionally   • Drug use: Never       Past Surgical History:   Procedure Laterality Date   • SHOULDER SURGERY Right 2006   • UMBILICAL HERNIA REPAIR         Patient Active Problem List   Diagnosis   • Cigarette smoker   • Elevated BP without diagnosis of hypertension   • Right hip pain   • Family history of early CAD   • Adult ADHD (attention deficit hyperactivity disorder)   • Class 1 obesity due to excess calories without serious comorbidity with body mass index (BMI) of 32.0 to 32.9 in adult   • Primary osteoarthritis of right hip   • Annual visit for general adult medical examination with abnormal findings         Current Outpatient Medications:   •  amphetamine-dextroamphetamine XR (Adderall XR) 20 MG 24 hr capsule, Take 2 capsules by mouth Every Morning, Disp: 60 capsule, Rfl: 0    Review of Systems    I have reviewed and confirmed the accuracy of the ROS as documented by the MA/LPN/RN Esther Tafoya MD      Objective   /80   Pulse 86   Temp 97.8 °F (36.6 °C)   Resp 18   Ht 172.7 cm (68\")   Wt 96.8 kg (213 lb 6.4 oz)   SpO2 99%   BMI 32.45 kg/m²   BP Readings from Last 3 Encounters:   08/30/22 122/80   08/02/22 138/78   06/06/22 122/78     Wt Readings from Last 3 Encounters:   08/30/22 96.8 kg (213 lb 6.4 oz)   08/05/22 96.6 kg (213 lb)   08/02/22 97 kg (213 lb 12.8 oz)     Physical Exam  Vitals and nursing note reviewed.   Constitutional:       General: He is not in acute distress.     Appearance: Normal appearance. He is well-developed.   HENT:      Head: Normocephalic and atraumatic.   Eyes:      Conjunctiva/sclera: Conjunctivae normal.      Pupils: Pupils are equal, round, and reactive to light.   Neck:      Thyroid: No thyromegaly.      Vascular: No JVD.   Cardiovascular:      Rate and Rhythm: Normal rate and regular rhythm.      Heart sounds: Normal heart " sounds. No murmur heard.  Pulmonary:      Breath sounds: Normal breath sounds. No wheezing or rales.   Abdominal:      General: Bowel sounds are normal.      Tenderness: There is no abdominal tenderness.   Musculoskeletal:         General: Normal range of motion.      Cervical back: Normal range of motion.   Lymphadenopathy:      Cervical: No cervical adenopathy.   Skin:     General: Skin is warm and dry.      Findings: No rash.   Neurological:      Mental Status: He is alert and oriented to person, place, and time.   Psychiatric:         Mood and Affect: Mood normal.         Behavior: Behavior normal.         Thought Content: Thought content normal.         Judgment: Judgment normal.         Data / Lab Results:    No results found for: HGBA1C     Lab Results   Component Value Date     (H) 05/10/2022     No results found for: CHOL  Lab Results   Component Value Date    TRIG 120 05/10/2022     Lab Results   Component Value Date    HDL 42 05/10/2022     No results found for: PSA  No results found for: WBC, HGB, HCT, MCV, PLT  Lab Results   Component Value Date    TSH 3.340 05/10/2022      Lab Results   Component Value Date    GLUCOSE 94 05/10/2022    BUN 14 05/10/2022    CREATININE 1.26 05/10/2022    BCR 11 05/10/2022    K 4.7 05/10/2022    CO2 24 05/10/2022    CALCIUM 9.9 05/10/2022    PROTENTOTREF 7.7 05/10/2022    ALBUMIN 4.9 05/10/2022    LABIL2 1.8 05/10/2022    AST 28 05/10/2022    ALT 32 05/10/2022     The 10-year ASCVD risk score (Reshmamelani CASILLAS Jr., et al., 2013) is: 3.4%    Values used to calculate the score:      Age: 52 years      Sex: Male      Is Non- : No      Diabetic: No      Tobacco smoker: No      Systolic Blood Pressure: 122 mmHg      Is BP treated: No      HDL Cholesterol: 42 mg/dL      Total Cholesterol: 183 mg/dL    Age-appropriate Screening Schedule:  Refer to the list below for future screening recommendations based on patient's age, sex and/or medical conditions.  Orders for these recommended tests are listed in the plan section. The patient has been provided with a written plan.    Health Maintenance   Topic Date Due   • ZOSTER VACCINE (1 of 2) 08/30/2022 (Originally 4/14/2022)   • TDAP/TD VACCINES (1 - Tdap) 08/30/2023 (Originally 4/14/1991)   • INFLUENZA VACCINE  10/01/2022           Assessment & Plan      Medications        Problem List         LOS        Diagnoses and all orders for this visit:    1. Annual visit for general adult medical examination with abnormal findings (Primary)    2. Class 1 obesity due to excess calories without serious comorbidity with body mass index (BMI) of 32.0 to 32.9 in adult    3. Screening for colon cancer  -     Cologuard - Stool, Per Rectum; Future    4. Cigarette smoker    5. Adult ADHD (attention deficit hyperactivity disorder)  -     amphetamine-dextroamphetamine XR (Adderall XR) 20 MG 24 hr capsule; Take 2 capsules by mouth Every Morning  Dispense: 60 capsule; Refill: 0      The patient was counseled regarding nutrition, physical activity, healthy weight, injury prevention, immunizations and preventative health screenings.    Bryan agrees to cologuard today for screening. Declines tdap in office will consider getting at pharmacy.    The natural history of prostate cancer and ongoing controversy regarding screening and potential treatment outcomes of prostate cancer has been discussed with the patient. The meaning of a false positive PSA and a false negative PSA has been discussed. He indicates understanding of the limitations of this screening test and wishes not to proceed with screening PSA testing.    Discussed slightly elevated LDL cholesterol need for increasing physical activity and reducing cholesterol diet he was considering changing from keto diet to Mediterranean diet which I strongly recommend and discussed in detail        ADHD, patient reports he has significantly increased concentration he is getting projects done at home  and is having easier time with conversations.  He states it does wear off before the end of the workday and was wondering about an increase.  We will increase from 30 to 40 mg daily, advised he may split the 40 mg dose 1% when he wakes up in seconds 2 to 4 hours later if needed to extend the total efficacy time.  Will return 4 weeks to reevaluate    Expected course, medications, and adverse effects discussed.  Call or return if worsening or persistent symptoms.  I wore protective equipment throughout this patient encounter including a mask, gloves, and eye protection.  Hand hygiene was performed before donning protective equipment and after removal when leaving the room. The complete contents of the Assessment and Plan and Data / Lab Results as documented above have been reviewed and addressed by myself with the patient today as part of an ongoing evaluation / treatment plan.  If some of the documentation has been copied from a previous note and is unchanged it indicates that this problem / plan has been assessed today but is stable from a previous visit and no changes have been recommended.

## 2022-08-30 ENCOUNTER — OFFICE VISIT (OUTPATIENT)
Dept: FAMILY MEDICINE CLINIC | Facility: CLINIC | Age: 52
End: 2022-08-30

## 2022-08-30 ENCOUNTER — TELEPHONE (OUTPATIENT)
Dept: FAMILY MEDICINE CLINIC | Facility: CLINIC | Age: 52
End: 2022-08-30

## 2022-08-30 ENCOUNTER — PATIENT MESSAGE (OUTPATIENT)
Dept: FAMILY MEDICINE CLINIC | Facility: CLINIC | Age: 52
End: 2022-08-30

## 2022-08-30 VITALS
SYSTOLIC BLOOD PRESSURE: 122 MMHG | BODY MASS INDEX: 32.34 KG/M2 | HEART RATE: 86 BPM | WEIGHT: 213.4 LBS | OXYGEN SATURATION: 99 % | DIASTOLIC BLOOD PRESSURE: 80 MMHG | RESPIRATION RATE: 18 BRPM | HEIGHT: 68 IN | TEMPERATURE: 97.8 F

## 2022-08-30 DIAGNOSIS — Z12.11 SCREENING FOR COLON CANCER: ICD-10-CM

## 2022-08-30 DIAGNOSIS — E66.09 CLASS 1 OBESITY DUE TO EXCESS CALORIES WITHOUT SERIOUS COMORBIDITY WITH BODY MASS INDEX (BMI) OF 32.0 TO 32.9 IN ADULT: ICD-10-CM

## 2022-08-30 DIAGNOSIS — Z00.01 ANNUAL VISIT FOR GENERAL ADULT MEDICAL EXAMINATION WITH ABNORMAL FINDINGS: Primary | ICD-10-CM

## 2022-08-30 DIAGNOSIS — F90.9 ADULT ADHD (ATTENTION DEFICIT HYPERACTIVITY DISORDER): Primary | ICD-10-CM

## 2022-08-30 DIAGNOSIS — F17.210 CIGARETTE SMOKER: ICD-10-CM

## 2022-08-30 DIAGNOSIS — F90.9 ADULT ADHD (ATTENTION DEFICIT HYPERACTIVITY DISORDER): ICD-10-CM

## 2022-08-30 PROCEDURE — 99396 PREV VISIT EST AGE 40-64: CPT | Performed by: FAMILY MEDICINE

## 2022-08-30 PROCEDURE — 99213 OFFICE O/P EST LOW 20 MIN: CPT | Performed by: FAMILY MEDICINE

## 2022-08-30 RX ORDER — DEXTROAMPHETAMINE SACCHARATE, AMPHETAMINE ASPARTATE, DEXTROAMPHETAMINE SULFATE AND AMPHETAMINE SULFATE 5; 5; 5; 5 MG/1; MG/1; MG/1; MG/1
TABLET ORAL
Qty: 60 TABLET | Refills: 0 | Status: SHIPPED | OUTPATIENT
Start: 2022-08-30 | End: 2022-09-30

## 2022-08-30 RX ORDER — DEXTROAMPHETAMINE SACCHARATE, AMPHETAMINE ASPARTATE MONOHYDRATE, DEXTROAMPHETAMINE SULFATE AND AMPHETAMINE SULFATE 7.5; 7.5; 7.5; 7.5 MG/1; MG/1; MG/1; MG/1
30 CAPSULE, EXTENDED RELEASE ORAL EVERY MORNING
Qty: 30 CAPSULE | Refills: 0 | Status: CANCELLED | OUTPATIENT
Start: 2022-08-30

## 2022-08-30 RX ORDER — DEXTROAMPHETAMINE SACCHARATE, AMPHETAMINE ASPARTATE MONOHYDRATE, DEXTROAMPHETAMINE SULFATE AND AMPHETAMINE SULFATE 5; 5; 5; 5 MG/1; MG/1; MG/1; MG/1
40 CAPSULE, EXTENDED RELEASE ORAL EVERY MORNING
Qty: 60 CAPSULE | Refills: 0 | Status: SHIPPED | OUTPATIENT
Start: 2022-08-30 | End: 2022-08-30 | Stop reason: RX

## 2022-08-30 NOTE — TELEPHONE ENCOUNTER
Yes, the immediate release should be significantly less expensive.  You will have to separate the 2 tablets.  Take 1 before work and the other 4 to 6 hours later.

## 2022-09-30 ENCOUNTER — OFFICE VISIT (OUTPATIENT)
Dept: FAMILY MEDICINE CLINIC | Facility: CLINIC | Age: 52
End: 2022-09-30

## 2022-09-30 VITALS
DIASTOLIC BLOOD PRESSURE: 74 MMHG | BODY MASS INDEX: 30.99 KG/M2 | RESPIRATION RATE: 18 BRPM | WEIGHT: 204.5 LBS | TEMPERATURE: 98.2 F | HEIGHT: 68 IN | HEART RATE: 102 BPM | OXYGEN SATURATION: 97 % | SYSTOLIC BLOOD PRESSURE: 124 MMHG

## 2022-09-30 DIAGNOSIS — E66.09 CLASS 1 OBESITY DUE TO EXCESS CALORIES WITHOUT SERIOUS COMORBIDITY WITH BODY MASS INDEX (BMI) OF 31.0 TO 31.9 IN ADULT: ICD-10-CM

## 2022-09-30 DIAGNOSIS — Z87.891 FORMER SMOKER: ICD-10-CM

## 2022-09-30 DIAGNOSIS — F90.9 ADULT ADHD (ATTENTION DEFICIT HYPERACTIVITY DISORDER): Primary | ICD-10-CM

## 2022-09-30 DIAGNOSIS — G89.29 CHRONIC PAIN OF RIGHT HIP: ICD-10-CM

## 2022-09-30 DIAGNOSIS — M25.551 CHRONIC PAIN OF RIGHT HIP: ICD-10-CM

## 2022-09-30 PROCEDURE — 99213 OFFICE O/P EST LOW 20 MIN: CPT | Performed by: FAMILY MEDICINE

## 2022-09-30 RX ORDER — DEXTROAMPHETAMINE SACCHARATE, AMPHETAMINE ASPARTATE MONOHYDRATE, DEXTROAMPHETAMINE SULFATE AND AMPHETAMINE SULFATE 5; 5; 5; 5 MG/1; MG/1; MG/1; MG/1
2 CAPSULE, EXTENDED RELEASE ORAL DAILY
COMMUNITY
Start: 2022-09-13 | End: 2022-10-12 | Stop reason: SDUPTHER

## 2022-09-30 NOTE — ASSESSMENT & PLAN NOTE
Patient's (Body mass index is 31.09 kg/m².) indicates that they are obese (BMI >30) with health conditions that include none . Weight is unchanged. BMI is is above average; BMI management plan is completed. We discussed portion control and increasing exercise.

## 2022-10-12 DIAGNOSIS — F90.9 ADULT ADHD (ATTENTION DEFICIT HYPERACTIVITY DISORDER): Primary | ICD-10-CM

## 2022-10-12 RX ORDER — DEXTROAMPHETAMINE SACCHARATE, AMPHETAMINE ASPARTATE MONOHYDRATE, DEXTROAMPHETAMINE SULFATE AND AMPHETAMINE SULFATE 5; 5; 5; 5 MG/1; MG/1; MG/1; MG/1
40 CAPSULE, EXTENDED RELEASE ORAL DAILY
Qty: 60 CAPSULE | Refills: 0 | Status: SHIPPED | OUTPATIENT
Start: 2022-10-12 | End: 2022-10-14 | Stop reason: RX

## 2022-10-14 ENCOUNTER — TELEPHONE (OUTPATIENT)
Dept: FAMILY MEDICINE CLINIC | Facility: CLINIC | Age: 52
End: 2022-10-14

## 2022-10-14 DIAGNOSIS — F90.9 ADULT ADHD (ATTENTION DEFICIT HYPERACTIVITY DISORDER): Primary | ICD-10-CM

## 2022-10-14 RX ORDER — DEXTROAMPHETAMINE SACCHARATE, AMPHETAMINE ASPARTATE, DEXTROAMPHETAMINE SULFATE AND AMPHETAMINE SULFATE 5; 5; 5; 5 MG/1; MG/1; MG/1; MG/1
20 TABLET ORAL 2 TIMES DAILY
Qty: 60 TABLET | Refills: 0 | Status: SHIPPED | OUTPATIENT
Start: 2022-10-14 | End: 2022-11-23 | Stop reason: SDUPTHER

## 2022-10-14 NOTE — TELEPHONE ENCOUNTER
----- Message from Bryan Rodgers sent at 10/14/2022  2:33 PM EDT -----  Regarding: Adderall XR  Contact: [phone]  Called [facility] to check on my prescription and the national outage is still happening.  The pharmacist that they have a little bit of the non extended release available, but it will run out shortly.  Can you send an order in for the non extended release again for now?  She also told me that we should definitely consider an alternative to the Adderall because the  can't keep up with the demand, like Vivance or something like that.

## 2022-10-14 NOTE — TELEPHONE ENCOUNTER
Please inform patient I am sending in the immediate release to take prior to work and a second dose 4 to 6 hours later.  If he wants to discuss alternative therapy have him check insurance coverage/formulary and let me know if he would like to change to Vyvanse with next prescription or a different alternative.  Would request he schedule an appointment in a month if we are going to make any additional changes.

## 2022-10-14 NOTE — TELEPHONE ENCOUNTER
Left detailed voice message for patient. Informed him of medication sent. Stated about checking with insurance on coverage of vyvanse and need for follow up if changing.

## 2022-10-26 ENCOUNTER — OFFICE VISIT (OUTPATIENT)
Dept: FAMILY MEDICINE CLINIC | Facility: CLINIC | Age: 52
End: 2022-10-26

## 2022-10-26 VITALS
BODY MASS INDEX: 31.98 KG/M2 | SYSTOLIC BLOOD PRESSURE: 124 MMHG | HEART RATE: 87 BPM | DIASTOLIC BLOOD PRESSURE: 82 MMHG | RESPIRATION RATE: 18 BRPM | WEIGHT: 211 LBS | HEIGHT: 68 IN | OXYGEN SATURATION: 97 % | TEMPERATURE: 98.7 F

## 2022-10-26 DIAGNOSIS — E66.09 CLASS 1 OBESITY DUE TO EXCESS CALORIES WITHOUT SERIOUS COMORBIDITY WITH BODY MASS INDEX (BMI) OF 32.0 TO 32.9 IN ADULT: ICD-10-CM

## 2022-10-26 DIAGNOSIS — U07.1 COVID-19 VIRUS INFECTION: Primary | ICD-10-CM

## 2022-10-26 DIAGNOSIS — Z87.891 FORMER SMOKER: ICD-10-CM

## 2022-10-26 LAB
EXPIRATION DATE: ABNORMAL
FLUAV AG UPPER RESP QL IA.RAPID: NOT DETECTED
FLUBV AG UPPER RESP QL IA.RAPID: NOT DETECTED
INTERNAL CONTROL: ABNORMAL
Lab: ABNORMAL
SARS-COV-2 AG UPPER RESP QL IA.RAPID: DETECTED

## 2022-10-26 PROCEDURE — 87428 SARSCOV & INF VIR A&B AG IA: CPT | Performed by: FAMILY MEDICINE

## 2022-10-26 PROCEDURE — 99213 OFFICE O/P EST LOW 20 MIN: CPT | Performed by: FAMILY MEDICINE

## 2022-10-26 NOTE — PROGRESS NOTES
Chief Complaint   Patient presents with   • URI       Subjective   Bryan Rodgers is a 52 y.o. male.     URI   This is a new problem. The current episode started in the past 7 days. The problem has been gradually worsening. The maximum temperature recorded prior to his arrival was 102 - 102.9 F. The fever has been present for 1 to 2 days. Associated symptoms include congestion, coughing, diarrhea and nausea. Pertinent negatives include no abdominal pain, chest pain, dysuria, ear pain, headaches, joint pain, joint swelling, neck pain, plugged ear sensation, rash, sinus pain, sneezing, sore throat, vomiting or wheezing. Associated symptoms comments: Swelling in lips. Treatments tried: tylenol. The treatment provided mild relief.      Patient has not yet done a COVID test.  Unvaccinated.      Past Medical History :  Active Ambulatory Problems     Diagnosis Date Noted   • Former smoker 04/25/2022   • Elevated BP without diagnosis of hypertension 04/25/2022   • Chronic pain of right hip 04/25/2022   • Family history of early CAD 04/25/2022   • Adult ADHD (attention deficit hyperactivity disorder) 08/01/2022   • Class 1 obesity due to excess calories without serious comorbidity with body mass index (BMI) of 31.0 to 31.9 in adult 08/01/2022   • Primary osteoarthritis of right hip 08/05/2022   • Annual visit for general adult medical examination with abnormal findings 08/30/2022     Resolved Ambulatory Problems     Diagnosis Date Noted   • No Resolved Ambulatory Problems     Past Medical History:   Diagnosis Date   • ADHD (attention deficit hyperactivity disorder)    • Arthritis        Medication List:    Current Outpatient Medications:   •  amphetamine-dextroamphetamine (Adderall) 20 MG tablet, Take 1 tablet by mouth 2 (Two) Times a Day. Prior to work and 4-6 hours later, Disp: 60 tablet, Rfl: 0    No Known Allergies    Social History     Tobacco Use   • Smoking status: Former     Packs/day: 1.00     Years: 10.00     Pack  "years: 10.00     Types: Cigarettes     Start date:      Quit date:      Years since quittin.8   • Smokeless tobacco: Never   • Tobacco comments:     has quit several times. only smoked from 1918-6075 and then started  until .   Substance Use Topics   • Alcohol use: Yes     Comment: occasionally       There is no immunization history on file for this patient.    Review of Systems   Constitutional: Positive for chills and fever.   HENT: Positive for congestion. Negative for ear pain, sneezing and sore throat.    Respiratory: Positive for cough. Negative for wheezing.    Cardiovascular: Negative for chest pain.   Gastrointestinal: Positive for diarrhea and nausea. Negative for abdominal pain and vomiting.   Genitourinary: Negative for dysuria.   Musculoskeletal: Negative for joint pain and neck pain.   Skin: Negative for rash.       Objective   Vitals:    10/26/22 0923   BP: 124/82   Pulse: 87   Resp: 18   Temp: 98.7 °F (37.1 °C)   SpO2: 97%   Weight: 95.7 kg (211 lb)   Height: 172.7 cm (68\")     Body mass index is 32.08 kg/m².    Physical Exam  Constitutional:       General: He is not in acute distress.     Appearance: Normal appearance. He is not ill-appearing or toxic-appearing.   HENT:      Head: Normocephalic and atraumatic.      Right Ear: External ear normal. Tympanic membrane is not perforated, erythematous or bulging.      Left Ear: External ear normal. Tympanic membrane is not perforated, erythematous or bulging.      Nose: Congestion present.      Mouth/Throat:      Mouth: Mucous membranes are moist.      Pharynx: Posterior oropharyngeal erythema present. No oropharyngeal exudate.   Eyes:      General: Lids are normal.         Right eye: No discharge.         Left eye: No discharge.      Extraocular Movements: Extraocular movements intact.      Conjunctiva/sclera: Conjunctivae normal.   Cardiovascular:      Rate and Rhythm: Normal rate and regular rhythm.      Heart sounds: Normal heart " sounds.   Pulmonary:      Effort: Pulmonary effort is normal.      Breath sounds: Normal breath sounds. No wheezing, rhonchi or rales.   Musculoskeletal:      Cervical back: Normal range of motion and neck supple. No tenderness.   Lymphadenopathy:      Cervical: No cervical adenopathy.   Skin:     General: Skin is warm and dry.      Findings: No rash.   Neurological:      General: No focal deficit present.      Mental Status: He is alert and oriented to person, place, and time.   Psychiatric:         Mood and Affect: Mood normal.         Thought Content: Thought content normal.       POCT SARS-CoV-2 Antigen SCOTTY    Collection Time: 10/26/22  9:42 AM    Specimen: Swab   Result Value Ref Range    SARS Antigen Detected (A) Not Detected, Presumptive Negative    Influenza A Antigen SCOTTY Not Detected Not Detected    Influenza B Antigen SCOTTY Not Detected Not Detected       Assessment & Plan     Diagnoses and all orders for this visit:    1. COVID-19 virus infection (Primary)  -     POCT SARS-CoV-2 Antigen SCOTTY    2. Former smoker    3. Class 1 obesity due to excess calories without serious comorbidity with body mass index (BMI) of 32.0 to 32.9 in adult    Acute COVID infection.  Treatment options discussed.  Risk factors for complicated Covid: obesity.  He is not currently interested in Paxlovid.    Continue OTC cough/cold medications PRN for symptom control.   He will continue to isolate at home.  Work note through the end of the week given.    F/U PRN.    Return if symptoms worsen or fail to improve.       Patient was given instructions and counseling regarding his/her condition or for health maintenance advice. Please see specific information pulled into the AVS if appropriate.     I wore protective equipment throughout this patient encounter to include N95 mask, gown and eye protection. Hand hygiene was performed before donning protective equipment and after removal when leaving the room.

## 2022-11-23 DIAGNOSIS — F90.9 ADULT ADHD (ATTENTION DEFICIT HYPERACTIVITY DISORDER): ICD-10-CM

## 2022-11-23 RX ORDER — DEXTROAMPHETAMINE SACCHARATE, AMPHETAMINE ASPARTATE, DEXTROAMPHETAMINE SULFATE AND AMPHETAMINE SULFATE 5; 5; 5; 5 MG/1; MG/1; MG/1; MG/1
20 TABLET ORAL 2 TIMES DAILY
Qty: 60 TABLET | Refills: 0 | Status: SHIPPED | OUTPATIENT
Start: 2022-11-23 | End: 2023-02-17

## 2023-01-20 ENCOUNTER — OFFICE VISIT (OUTPATIENT)
Dept: FAMILY MEDICINE CLINIC | Facility: CLINIC | Age: 53
End: 2023-01-20
Payer: COMMERCIAL

## 2023-01-20 VITALS
SYSTOLIC BLOOD PRESSURE: 132 MMHG | TEMPERATURE: 97.8 F | WEIGHT: 222.5 LBS | OXYGEN SATURATION: 96 % | HEART RATE: 82 BPM | HEIGHT: 68 IN | RESPIRATION RATE: 18 BRPM | BODY MASS INDEX: 33.72 KG/M2 | DIASTOLIC BLOOD PRESSURE: 80 MMHG

## 2023-01-20 DIAGNOSIS — E66.09 CLASS 1 OBESITY DUE TO EXCESS CALORIES WITHOUT SERIOUS COMORBIDITY WITH BODY MASS INDEX (BMI) OF 33.0 TO 33.9 IN ADULT: ICD-10-CM

## 2023-01-20 DIAGNOSIS — F90.9 ADULT ADHD (ATTENTION DEFICIT HYPERACTIVITY DISORDER): Primary | ICD-10-CM

## 2023-01-20 DIAGNOSIS — Z87.891 FORMER SMOKER: ICD-10-CM

## 2023-01-20 PROCEDURE — 99213 OFFICE O/P EST LOW 20 MIN: CPT | Performed by: FAMILY MEDICINE

## 2023-01-20 NOTE — PROGRESS NOTES
"Chief Complaint  ADHD    History of Present Illness  Bryan Rodgers presents today for follow up on ADHD.      ADHD:  Symptoms include impulsivity, inability to follow directions, inattention, low self-confidence, need for frequent task redirection, poor work performance, forgetfulness, careless mistakes, losing things, avoiding mental tasks, fidgeting and interrupting others. The symptoms occur at home, at work, during social events, in the morning , in the afternoon, in the evening and on weekends.  Patient was started on treatment 08/01/2022. The symptoms are described as Moderate in severity. The symptoms are described as stable. Symptoms are exacerbated by group meetings, work environment, distracting activities, conversation and mental effort. Symptoms are relieved by attention holding activities. Associated symptoms include  Current treatment includes dextroamphetamine/amphetamine (Adderall).  However, Bryan has not had medication since the end of September due to nation wide shortage.  Patient reports while on treatment he can concentrate and stay focused better at work.    Current Outpatient Medications on File Prior to Visit   Medication Sig   • amphetamine-dextroamphetamine (Adderall) 20 MG tablet Take 1 tablet by mouth 2 (Two) Times a Day. Prior to work and 4-6 hours later (Patient taking differently: Take 20 mg by mouth 2 (Two) Times a Day. Prior to work and 4-6 hours later    Has not been taking due to national shortage)     No current facility-administered medications on file prior to visit.       Objective   Vital Signs:   /80 (BP Location: Right arm, Patient Position: Sitting, Cuff Size: Adult)   Pulse 82   Temp 97.8 °F (36.6 °C) (Temporal)   Resp 18   Ht 172.7 cm (68\")   Wt 101 kg (222 lb 8 oz)   SpO2 96% Comment: room air  BMI 33.83 kg/m²       Physical Exam  Vitals and nursing note reviewed.   Constitutional:       General: He is not in acute distress.     Appearance: Normal appearance. He " is well-developed. He is not ill-appearing.   HENT:      Head: Normocephalic and atraumatic.   Eyes:      Conjunctiva/sclera: Conjunctivae normal.      Pupils: Pupils are equal, round, and reactive to light.   Neck:      Thyroid: No thyromegaly.      Vascular: No JVD.   Cardiovascular:      Rate and Rhythm: Normal rate and regular rhythm.      Heart sounds: Normal heart sounds. No murmur heard.  Pulmonary:      Breath sounds: Normal breath sounds. No wheezing or rales.   Musculoskeletal:         General: Normal range of motion.      Cervical back: Normal range of motion.   Lymphadenopathy:      Cervical: No cervical adenopathy.   Skin:     General: Skin is warm and dry.      Findings: No rash.   Neurological:      Mental Status: He is alert and oriented to person, place, and time.   Psychiatric:         Mood and Affect: Mood normal.         Behavior: Behavior normal.         Thought Content: Thought content normal.         Judgment: Judgment normal.            No visits with results within 1 Day(s) from this visit.   Latest known visit with results is:   Office Visit on 10/26/2022   Component Date Value Ref Range Status   • SARS Antigen 10/26/2022 Detected (A)  Not Detected, Presumptive Negative Final   • Influenza A Antigen SCOTTY 10/26/2022 Not Detected  Not Detected Final   • Influenza B Antigen SCOTTY 10/26/2022 Not Detected  Not Detected Final   • Internal Control 10/26/2022 Passed  Passed Final   • Lot Number 10/26/2022 [id number]   Final   • Expiration Date 10/26/2022 09/30/2024   Final       Lab Results   Component Value Date    CHLPL 183 05/10/2022    TRIG 120 05/10/2022    HDL 42 05/10/2022     (H) 05/10/2022     Lab Results   Component Value Date    TSH 3.340 05/10/2022     Lab Results   Component Value Date    GLUCOSE 94 05/10/2022    BUN 14 05/10/2022    CREATININE 1.26 05/10/2022    BCR 11 05/10/2022    K 4.7 05/10/2022    CO2 24 05/10/2022    CALCIUM 9.9 05/10/2022    PROTENTOTREF 7.7 05/10/2022     ALBUMIN 4.9 05/10/2022    LABIL2 1.8 05/10/2022    AST 28 05/10/2022    ALT 32 05/10/2022     No results found for: WBC, HGB, HCT, MCV, PLT                 Assessment and Plan    Diagnoses and all orders for this visit:    1. Adult ADHD (attention deficit hyperactivity disorder) (Primary)  -     lisdexamfetamine (Vyvanse) 30 MG capsule; Take 1 capsule by mouth Every Morning  Dispense: 30 capsule; Refill: 0    2. Class 1 obesity due to excess calories without serious comorbidity with body mass index (BMI) of 33.0 to 33.9 in adult  Assessment & Plan:  Patient's (Body mass index is 33.83 kg/m².) indicates that they are obese (BMI >30) with health conditions that include none . Weight is unchanged. BMI is is above average; BMI management plan is completed. We discussed portion control and increasing exercise.       3. Former smoker  ADHD, was doing well on medication however due to availability issues is not currently on treatment.  Changing to Vyvance, and will adjust as needed.  We will watch for elevation in blood pressure.  Did encourage influenza, COVID, and shingles vaccinations.  Do recommend increase physical activity and reduced calorie diet for weight reduction increase physical activity and to follow DASH diet    There are no discontinued medications.      Follow Up     Return in about 4 weeks (around 2/17/2023) for Recheck.    Patient was given instructions and counseling regarding his condition or for health maintenance advice. Please see specific information pulled into the AVS if appropriate.

## 2023-01-20 NOTE — ASSESSMENT & PLAN NOTE
Patient's (Body mass index is 33.83 kg/m².) indicates that they are obese (BMI >30) with health conditions that include none . Weight is unchanged. BMI is is above average; BMI management plan is completed. We discussed portion control and increasing exercise.

## 2023-02-14 NOTE — PROGRESS NOTES
Chief Complaint  ADHD    History of Present Illness  Bryan Rodgers presents today for adhd follow up.    ADHD:  Symptoms include impulsivity, inattention, low self-confidence, need for frequent task redirection, poor work performance, forgetfulness, careless mistakes, losing things, avoiding mental tasks, fidgeting, excessive talking and interrupting others. The symptoms occur at home, at work, during social events, in the morning , in the afternoon, in the evening and on weekends. Onset was several years ago. The symptoms are described as Moderate in severity. The symptoms are described as stable. Symptoms are exacerbated by group meetings, work environment, distracting activities, conversation and mental effort. Symptoms are relieved by attention holding activities and stimulant medication. Current treatment includes none and Vyvanse. By report, Bryan is compliant most of the time.  At last visit 1 month ago patient was started on Vyvanse due to availability as with previous treatment, Adderall ER (would work for 12 hours) , states that the Vyvanse is somewhat helpful, not as effective as previous treatment for improvement with concentration and focus however it does not seem to only last 6 hours.  Able to sleep without any problem  adderall would keep up a couple hours after regular bed time.     Working on healthy diet. Cut out sugars. Would like to get down to 180#      Current Outpatient Medications on File Prior to Visit   Medication Sig   • [DISCONTINUED] lisdexamfetamine (Vyvanse) 30 MG capsule Take 1 capsule by mouth Every Morning   • [DISCONTINUED] amphetamine-dextroamphetamine (Adderall) 20 MG tablet Take 1 tablet by mouth 2 (Two) Times a Day. Prior to work and 4-6 hours later (Patient taking differently: Take 20 mg by mouth 2 (Two) Times a Day. Prior to work and 4-6 hours later    Has not been taking due to national shortage)     No current facility-administered medications on file prior to visit.  "      Objective   Vital Signs:   /72 (BP Location: Right arm, Patient Position: Sitting, Cuff Size: Adult)   Pulse 78   Temp 98.2 °F (36.8 °C) (Temporal)   Resp 18   Ht 172.7 cm (68\")   Wt 95.5 kg (210 lb 8 oz)   SpO2 98% Comment: room air  BMI 32.01 kg/m²       Physical Exam  Vitals and nursing note reviewed.   Constitutional:       General: He is not in acute distress.     Appearance: Normal appearance. He is well-developed.   HENT:      Head: Normocephalic and atraumatic.   Eyes:      Pupils: Pupils are equal, round, and reactive to light.   Cardiovascular:      Rate and Rhythm: Regular rhythm.      Heart sounds: No murmur heard.  Pulmonary:      Breath sounds: Normal breath sounds. No wheezing or rales.   Skin:     General: Skin is warm and dry.      Findings: No rash.   Neurological:      Mental Status: He is alert and oriented to person, place, and time.   Psychiatric:         Mood and Affect: Mood normal.         Behavior: Behavior normal.         Thought Content: Thought content normal.         Judgment: Judgment normal.            No visits with results within 1 Day(s) from this visit.   Latest known visit with results is:   Office Visit on 10/26/2022   Component Date Value Ref Range Status   • SARS Antigen 10/26/2022 Detected (A)  Not Detected, Presumptive Negative Final   • Influenza A Antigen SCOTTY 10/26/2022 Not Detected  Not Detected Final   • Influenza B Antigen SCOTTY 10/26/2022 Not Detected  Not Detected Final   • Internal Control 10/26/2022 Passed  Passed Final   • Lot Number 10/26/2022 [id number]   Final   • Expiration Date 10/26/2022 09/30/2024   Final       Lab Results   Component Value Date    CHLPL 183 05/10/2022    TRIG 120 05/10/2022    HDL 42 05/10/2022     (H) 05/10/2022     Lab Results   Component Value Date    TSH 3.340 05/10/2022     Lab Results   Component Value Date    GLUCOSE 94 05/10/2022    BUN 14 05/10/2022    CREATININE 1.26 05/10/2022    BCR 11 05/10/2022    K 4.7 " 05/10/2022    CO2 24 05/10/2022    CALCIUM 9.9 05/10/2022    PROTENTOTREF 7.7 05/10/2022    ALBUMIN 4.9 05/10/2022    LABIL2 1.8 05/10/2022    AST 28 05/10/2022    ALT 32 05/10/2022     No results found for: WBC, HGB, HCT, MCV, PLT                   Assessment and Plan    Diagnoses and all orders for this visit:    1. Adult ADHD (attention deficit hyperactivity disorder) (Primary)  -     lisdexamfetamine (Vyvanse) 40 MG capsule; Take 1 capsule by mouth Every Morning  Dispense: 30 capsule; Refill: 0    2. Class 1 obesity due to excess calories without serious comorbidity with body mass index (BMI) of 32.0 to 32.9 in adult  Assessment & Plan:  Patient's (Body mass index is 32.01 kg/m².) indicates that they are obese (BMI >30) with health conditions that include none . Weight is unchanged. BMI  is above average; BMI management plan is completed. We discussed portion control and increasing exercise.     ADHD partial response with Vyvanse 30 mg increasing to 40 mg.  Obesity.  Patient is working on weight reduction through improved diet he has also brought about bike that he will start using with better weather.      Medications Discontinued During This Encounter   Medication Reason   • amphetamine-dextroamphetamine (Adderall) 20 MG tablet *Therapy completed   • lisdexamfetamine (Vyvanse) 30 MG capsule Reorder         Follow Up     Return in about 4 weeks (around 3/17/2023) for Recheck add.    Patient was given instructions and counseling regarding his condition or for health maintenance advice. Please see specific information pulled into the AVS if appropriate.

## 2023-02-17 ENCOUNTER — OFFICE VISIT (OUTPATIENT)
Dept: FAMILY MEDICINE CLINIC | Facility: CLINIC | Age: 53
End: 2023-02-17
Payer: COMMERCIAL

## 2023-02-17 VITALS
RESPIRATION RATE: 18 BRPM | TEMPERATURE: 98.2 F | HEIGHT: 68 IN | WEIGHT: 210.5 LBS | HEART RATE: 78 BPM | OXYGEN SATURATION: 98 % | DIASTOLIC BLOOD PRESSURE: 72 MMHG | SYSTOLIC BLOOD PRESSURE: 124 MMHG | BODY MASS INDEX: 31.9 KG/M2

## 2023-02-17 DIAGNOSIS — F90.9 ADULT ADHD (ATTENTION DEFICIT HYPERACTIVITY DISORDER): Primary | ICD-10-CM

## 2023-02-17 DIAGNOSIS — E66.09 CLASS 1 OBESITY DUE TO EXCESS CALORIES WITHOUT SERIOUS COMORBIDITY WITH BODY MASS INDEX (BMI) OF 32.0 TO 32.9 IN ADULT: ICD-10-CM

## 2023-02-17 PROCEDURE — 99213 OFFICE O/P EST LOW 20 MIN: CPT | Performed by: FAMILY MEDICINE

## 2023-02-17 NOTE — ASSESSMENT & PLAN NOTE
Patient's (Body mass index is 32.01 kg/m².) indicates that they are obese (BMI >30) with health conditions that include none . Weight is unchanged. BMI  is above average; BMI management plan is completed. We discussed portion control and increasing exercise.

## 2023-03-08 NOTE — PROGRESS NOTES
"Chief Complaint  ADHD    History of Present Illness  Bryan Rodgers presents today for ADHD follow up.    ADHD:  Symptoms include dependence on supervision, impulsivity, inability to follow directions, inattention, low self-confidence, need for frequent task redirection, poor work performance, forgetfulness, careless mistakes, losing things, avoiding mental tasks, excessive talking and interrupting others. The symptoms occur at home, at work, during social events, in the morning , in the afternoon, in the evening and on weekends. Onset was 3-4 years ago. The symptoms are described as Severe in severity. The symptoms are described as unchanged. Symptoms are exacerbated by group meetings, work environment, fatigue, distracting activities, conversation and mental effort. Symptoms are relieved by attention holding activities and stimulant medication.  Current treatment includes Vyvanse. By report, Bryan is compliant most of the time.    Patient reports that he can't tell a difference on the Vyvanse as if it is not working.  Previously had done well on Adderall however with national shortage he was unable to get it consistently.  May be causing gassiness and bloating and feel cold but only on weekends  no other side effects    No current outpatient medications on file prior to visit.     No current facility-administered medications on file prior to visit.       Objective   Vital Signs:   /76 (BP Location: Right arm, Patient Position: Sitting, Cuff Size: Adult)   Pulse 101   Temp 97 °F (36.1 °C) (Temporal)   Resp 18   Ht 172.7 cm (68\")   Wt 93.6 kg (206 lb 4 oz)   SpO2 97%   BMI 31.36 kg/m²       Physical Exam  Vitals and nursing note reviewed.   Constitutional:       General: He is not in acute distress.     Appearance: He is well-developed.   HENT:      Head: Normocephalic and atraumatic.   Eyes:      Pupils: Pupils are equal, round, and reactive to light.   Cardiovascular:      Rate and Rhythm: Regular rhythm.    "   Heart sounds: No murmur heard.  Pulmonary:      Breath sounds: Normal breath sounds. No wheezing or rales.   Skin:     General: Skin is warm and dry.      Findings: No rash.   Neurological:      Mental Status: He is alert and oriented to person, place, and time.   Psychiatric:         Mood and Affect: Mood normal.         Behavior: Behavior normal.         Thought Content: Thought content normal.         Judgment: Judgment normal.            No visits with results within 1 Day(s) from this visit.   Latest known visit with results is:   Office Visit on 10/26/2022   Component Date Value Ref Range Status   • SARS Antigen 10/26/2022 Detected (A)  Not Detected, Presumptive Negative Final   • Influenza A Antigen SCOTTY 10/26/2022 Not Detected  Not Detected Final   • Influenza B Antigen SCOTTY 10/26/2022 Not Detected  Not Detected Final   • Internal Control 10/26/2022 Passed  Passed Final   • Lot Number 10/26/2022 [id number]   Final   • Expiration Date 10/26/2022 09/30/2024   Final       Lab Results   Component Value Date    CHLPL 183 05/10/2022    TRIG 120 05/10/2022    HDL 42 05/10/2022     (H) 05/10/2022     Lab Results   Component Value Date    TSH 3.340 05/10/2022     Lab Results   Component Value Date    GLUCOSE 94 05/10/2022    BUN 14 05/10/2022    CREATININE 1.26 05/10/2022    BCR 11 05/10/2022    K 4.7 05/10/2022    CO2 24 05/10/2022    CALCIUM 9.9 05/10/2022    PROTENTOTREF 7.7 05/10/2022    ALBUMIN 4.9 05/10/2022    LABIL2 1.8 05/10/2022    AST 28 05/10/2022    ALT 32 05/10/2022     No results found for: WBC, HGB, HCT, MCV, PLT                   Assessment and Plan    Diagnoses and all orders for this visit:    1. Adult ADHD (attention deficit hyperactivity disorder) (Primary)  -     lisdexamfetamine (Vyvanse) 50 MG capsule; Take 1 capsule by mouth Every Morning  Dispense: 30 capsule; Refill: 0    2. Class 1 obesity due to excess calories without serious comorbidity with body mass index (BMI) of 31.0 to 31.9 in  adult  Assessment & Plan:  Patient's (Body mass index is 31.36 kg/m².) indicates that they are obese (BMI >30) with health conditions that include none . Weight is unchanged. BMI  is above average; BMI management plan is completed. We discussed portion control and increasing exercise.       3. Former smoker    ADHD, suboptimal Symptomatic control with Vyvanse.  Discussed increasing versus changing medication. Increasing Vyvanse to 50 mg.     Obesity, patient has managed to lose 16 pounds since January.  Encouraged to keep up the good work dietary calorie restriction and increase physical activity.    Medications Discontinued During This Encounter   Medication Reason   • lisdexamfetamine (Vyvanse) 40 MG capsule Reorder         Follow Up     Return in about 3 weeks (around 4/10/2023) for Recheck adhd.    Patient was given instructions and counseling regarding his condition or for health maintenance advice. Please see specific information pulled into the AVS if appropriate.

## 2023-03-20 ENCOUNTER — OFFICE VISIT (OUTPATIENT)
Dept: FAMILY MEDICINE CLINIC | Facility: CLINIC | Age: 53
End: 2023-03-20
Payer: COMMERCIAL

## 2023-03-20 VITALS
BODY MASS INDEX: 31.26 KG/M2 | OXYGEN SATURATION: 97 % | RESPIRATION RATE: 18 BRPM | HEIGHT: 68 IN | WEIGHT: 206.25 LBS | SYSTOLIC BLOOD PRESSURE: 124 MMHG | TEMPERATURE: 97 F | DIASTOLIC BLOOD PRESSURE: 76 MMHG | HEART RATE: 101 BPM

## 2023-03-20 DIAGNOSIS — F90.9 ADULT ADHD (ATTENTION DEFICIT HYPERACTIVITY DISORDER): Primary | ICD-10-CM

## 2023-03-20 DIAGNOSIS — E66.09 CLASS 1 OBESITY DUE TO EXCESS CALORIES WITHOUT SERIOUS COMORBIDITY WITH BODY MASS INDEX (BMI) OF 31.0 TO 31.9 IN ADULT: ICD-10-CM

## 2023-03-20 DIAGNOSIS — Z87.891 FORMER SMOKER: ICD-10-CM

## 2023-03-20 PROCEDURE — 99213 OFFICE O/P EST LOW 20 MIN: CPT | Performed by: FAMILY MEDICINE

## 2023-03-20 NOTE — ASSESSMENT & PLAN NOTE
Patient's (Body mass index is 31.36 kg/m².) indicates that they are obese (BMI >30) with health conditions that include none . Weight is unchanged. BMI  is above average; BMI management plan is completed. We discussed portion control and increasing exercise.

## 2023-05-22 DIAGNOSIS — F90.9 ADULT ADHD (ATTENTION DEFICIT HYPERACTIVITY DISORDER): ICD-10-CM

## 2023-05-22 NOTE — TELEPHONE ENCOUNTER
Please inform patient I am renewing however an appointment needs to be scheduled before additional refills will be approved.

## 2023-09-07 DIAGNOSIS — F90.9 ADULT ADHD (ATTENTION DEFICIT HYPERACTIVITY DISORDER): ICD-10-CM

## 2023-10-09 DIAGNOSIS — F90.9 ADULT ADHD (ATTENTION DEFICIT HYPERACTIVITY DISORDER): ICD-10-CM

## 2023-10-09 NOTE — PROGRESS NOTES
Chief Complaint   Patient presents with    Annual Exam    ADHD    Erectile Dysfunction       History of Present Illness  Subjective   Bryan Rodgers is a 53 y.o. male.       The patient is here: to discuss health maintenance and disease prevention to follow up on multiple medical problems.  Last comprehensive physical was on 08/30/2022.  Previous physical was performed by  Esther Tafoya MD  Overall has: moderate activity with work/home activities, good appetite, feels well with minor complaints, good energy level, is sleeping well, and returned to full activity. Nutrition: appropriate balanced diet, balanced diet, and eating a variety of foods. Last tetanus shot was unknown.     ADHD:  Symptoms include dependence on supervision, impulsivity, inability to follow directions, inattention, low self-confidence, need for frequent task redirection, poor work performance, forgetfulness, losing things, avoiding mental tasks, fidgeting, excessive talking, and interrupting others. The symptoms occur at home, at work, during social events, in the morning , in the afternoon, in the evening, and on weekends. Onset was 3-4 years ago. The symptoms are described as Severe in severity. The symptoms are described as unchanged. Symptoms are exacerbated by group meetings, work environment, fatigue, distracting activities, conversation, and mental effort. Symptoms are relieved by attention holding activities and stimulant medication. Associated symptoms include  none . Current treatment includes Vyvanse 50mg. By report, Bryan is compliant most of the time. Bryan states medications works sometimes and then other times it does not work as well for him. He states he might try it again for a month or discuss switching medications. Work Midnight shift and take prior to work. help with focus and concentration however after 5-6 hours is wearing off. Believe adderall XR worked better.     Erectile Dysfunction: Patient complains of erectile  dysfunction.  Onset of dysfunction was ongoing and reoccuring.  Patient states the nature of difficulty is both attaining and maintaining erection. Current treatments include Cialis 10mg as needed.     Influenza immunization was not given due to patient refusal.      Health Maintenance   Topic Date Due    COVID-19 Vaccine (1) Never done    TDAP/TD VACCINES (1 - Tdap) Never done    ZOSTER VACCINE (1 of 2) Never done    LUNG CANCER SCREENING  Never done    INFLUENZA VACCINE  Never done    ANNUAL PHYSICAL  10/11/2024    BMI FOLLOWUP  10/11/2024    COLORECTAL CANCER SCREENING  09/15/2025    HEPATITIS C SCREENING  Completed    Pneumococcal Vaccine 0-64  Aged Out       PHQ-9 Depression Screening  Little interest or pleasure in doing things? 0-->not at all   Feeling down, depressed, or hopeless? 0-->not at all   Trouble falling or staying asleep, or sleeping too much? 0-->not at all   Feeling tired or having little energy? 0-->not at all   Poor appetite or overeating? 0-->not at all   Feeling bad about yourself - or that you are a failure or have let yourself or your family down? 0-->not at all   Trouble concentrating on things, such as reading the newspaper or watching television? 2-->more than half the days   Moving or speaking so slowly that other people could have noticed? Or the opposite - being so fidgety or restless that you have been moving around a lot more than usual? 0-->not at all   Thoughts that you would be better off dead, or of hurting yourself in some way? 0-->not at all   PHQ-9 Total Score 2   If you checked off any problems, how difficult have these problems made it for you to do your work, take care of things at home, or get along with other people? somewhat difficult           Recent Hospitalizations:  No hospitalization(s) within the last year..  ccc      I personally reviewed and updated the patient's allergies, medications, problem list, and past medical, surgical, social, and family history. I have  reviewed and confirmed the accuracy of the HPI and ROS as documented by the MA/LPN/RN Esther Tafoya MD    Family History   Problem Relation Age of Onset    Cancer Mother         Breast cancer    Other Father         CHF, heart transplant    Cancer Maternal Uncle         esophageal cancer    Cancer Paternal Aunt         liver cancer    Other Maternal Grandmother         glaucoma    Cancer Paternal Grandmother        Social History     Tobacco Use    Smoking status: Former     Packs/day: 1.00     Years: 25.00     Additional pack years: 0.00     Total pack years: 25.00     Types: Cigarettes     Start date: 1997     Quit date: 2022     Years since quittin.8    Smokeless tobacco: Never    Tobacco comments:     has quit several times. only smoked from 1649-3718 and then started 2020 until .   Vaping Use    Vaping Use: Never used   Substance Use Topics    Alcohol use: Yes     Comment: occasionally    Drug use: Never       Past Surgical History:   Procedure Laterality Date    SHOULDER SURGERY Right 2006    UMBILICAL HERNIA REPAIR         Patient Active Problem List   Diagnosis    History of cigarette smoking    Elevated BP without diagnosis of hypertension    Chronic pain of right hip    Family history of early CAD    Adult ADHD (attention deficit hyperactivity disorder)    Class 1 obesity due to excess calories without serious comorbidity with body mass index (BMI) of 33.0 to 33.9 in adult    Primary osteoarthritis of right hip    Annual visit for general adult medical examination with abnormal findings    Erectile dysfunction         Current Outpatient Medications:     lisdexamfetamine (Vyvanse) 60 MG capsule, Take 1 capsule by mouth Every Morning, Disp: 30 capsule, Rfl: 0    tadalafil (Cialis) 5 MG tablet, Take 2 tablets by mouth Daily As Needed for Erectile Dysfunction., Disp: 30 tablet, Rfl: 1    Objective   /80 (BP Location: Right arm, Patient Position: Sitting, Cuff Size: Adult)    "Pulse 90   Temp 98.9 °F (37.2 °C) (Temporal)   Resp 18   Ht 172.7 cm (68\")   Wt 98.9 kg (218 lb)   SpO2 95%   BMI 33.15 kg/m²   BP Readings from Last 3 Encounters:   10/11/23 132/80   07/19/23 128/68   03/20/23 124/76     Wt Readings from Last 3 Encounters:   10/11/23 98.9 kg (218 lb)   07/19/23 95.5 kg (210 lb 8 oz)   03/20/23 93.6 kg (206 lb 4 oz)     Physical Exam  Vitals and nursing note reviewed.   Constitutional:       General: He is not in acute distress.     Appearance: Normal appearance. He is well-developed.   HENT:      Head: Normocephalic and atraumatic.   Eyes:      Conjunctiva/sclera: Conjunctivae normal.      Pupils: Pupils are equal, round, and reactive to light.   Neck:      Thyroid: No thyromegaly.      Vascular: No JVD.   Cardiovascular:      Rate and Rhythm: Normal rate and regular rhythm.      Heart sounds: Normal heart sounds. No murmur heard.  Pulmonary:      Breath sounds: Normal breath sounds. No wheezing or rales.   Abdominal:      General: Bowel sounds are normal.      Tenderness: There is no abdominal tenderness.   Musculoskeletal:         General: Normal range of motion.      Cervical back: Normal range of motion.   Lymphadenopathy:      Cervical: No cervical adenopathy.   Skin:     General: Skin is warm and dry.      Findings: No rash.   Neurological:      Mental Status: He is alert and oriented to person, place, and time.   Psychiatric:         Mood and Affect: Mood normal.         Behavior: Behavior normal.         Thought Content: Thought content normal.         Judgment: Judgment normal.         Data / Lab Results:  No results found for: \"HGBA1C\"      DATE OF EXAM:  6/8/2022 2:58 PM     PROCEDURE:  XR HIP W OR WO PELVIS 2-3 VIEW RIGHT-     INDICATIONS:  Intermittent right hip pain; M25.551-Pain in right hip     COMPARISON:  No Comparisons Available     TECHNIQUE:   AP and Lateral views of the right hip were obtained.     FINDINGS:  There is no fracture or dislocation. There is " moderate to severe right  hip osteoarthritis. There is abnormal configuration of the femoral head  neck junction consistent with CAM-type deformity. Included portions of  the pelvis are intact.      IMPRESSION:  1. Negative for fracture.  2. 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     Electronically Signed By-Gregory Ayala MD On:6/8/2022 3:56 PM  This report was finalized on [id number] by  Gregory Ayala MD.     Imaging    Age-appropriate Screening Schedule:  Refer to the list below for future screening recommendations based on patient's age, sex and/or medical conditions. Orders for these recommended tests are listed in the plan section. The patient has been provided with a written plan.        Assessment & Plan      Medications        Problem List         LOS    BMI is >= 30 and <35. (Class 1 Obesity). The following options were offered after discussion;: exercise counseling/recommendations and nutrition counseling/recommendations     Diagnoses and all orders for this visit:    1. Annual visit for general adult medical examination with abnormal findings (Primary)    2. Adult ADHD (attention deficit hyperactivity disorder)  -     lisdexamfetamine (Vyvanse) 60 MG capsule; Take 1 capsule by mouth Every Morning  Dispense: 30 capsule; Refill: 0    3. Erectile dysfunction, unspecified erectile dysfunction type  -     tadalafil (Cialis) 5 MG tablet; Take 2 tablets by mouth Daily As Needed for Erectile Dysfunction.  Dispense: 30 tablet; Refill: 1  -     Testosterone; Future    4. Class 1 obesity due to excess calories without serious comorbidity with body mass index (BMI) of 33.0 to 33.9 in adult  Assessment & Plan:  Patient's (Body mass index is 33.15 kg/m².) indicates that they are obese (BMI >30) with health conditions that include none . Weight is worsening. BMI  is above average; BMI management plan is completed. We discussed portion control and increasing  exercise.       5. Screening for cholesterol level  -     Lipid panel; Future    6. Screening for diabetes mellitus  -     Comprehensive metabolic panel; Future    7. History of cigarette smoking  Comments:  qiut 2022 1 ppd for 4-5 years 1/2 ppd 8 years        The patient was counseled regarding nutrition, physical activity, healthy weight, injury prevention, immunizations and preventative health screenings.  Do recommend increase physical activity and reduced calorie diet for weight reduction.  Ordering labs to screen for diabetes and cholesterol.  Encouraged Tdap, Influenza, Prevnar 20 (Pneumococcal 20-valent conjugate), Shingrix, and COVID19 vaccinations  Quit smoking last year with assistance of welbutrin.  Discussed low-dose CT for lung cancer screening, patient has less than 10-pack-year history and technically does not qualify, states he would decline screening at this time anyway.  Patient does have history of erectile dysfunction and is requesting prescription for Cialis, prescription given in order to check testosterone level.  ADHD currently on Vyvanse 50 mg daily with suboptimal symptom control as per HPI above, increasing to 60 mg daily and will recheck in 1 month.  The    Return in about 4 weeks (around 11/8/2023) for Recheck ADHD.      Expected course, medications, and adverse effects discussed.    Call or return if worsening or persistent symptoms.   Hand hygiene was performed before and after exam.   The complete contents of the Assessment and Plan and Data / Lab Results as documented above have been reviewed and addressed by myself with the patient today as part of an ongoing evaluation / treatment plan.    If separate E/M service has been provided today it was significant, medically necessary, and separately identifiable.

## 2023-10-10 RX ORDER — LISDEXAMFETAMINE DIMESYLATE CAPSULES 50 MG/1
50 CAPSULE ORAL EVERY MORNING
Qty: 30 CAPSULE | Refills: 0 | OUTPATIENT
Start: 2023-10-10

## 2023-10-11 ENCOUNTER — OFFICE VISIT (OUTPATIENT)
Dept: FAMILY MEDICINE CLINIC | Facility: CLINIC | Age: 53
End: 2023-10-11
Payer: COMMERCIAL

## 2023-10-11 VITALS
TEMPERATURE: 98.9 F | HEIGHT: 68 IN | SYSTOLIC BLOOD PRESSURE: 132 MMHG | HEART RATE: 90 BPM | RESPIRATION RATE: 18 BRPM | WEIGHT: 218 LBS | BODY MASS INDEX: 33.04 KG/M2 | DIASTOLIC BLOOD PRESSURE: 80 MMHG | OXYGEN SATURATION: 95 %

## 2023-10-11 DIAGNOSIS — Z13.1 SCREENING FOR DIABETES MELLITUS: ICD-10-CM

## 2023-10-11 DIAGNOSIS — E66.09 CLASS 1 OBESITY DUE TO EXCESS CALORIES WITHOUT SERIOUS COMORBIDITY WITH BODY MASS INDEX (BMI) OF 33.0 TO 33.9 IN ADULT: ICD-10-CM

## 2023-10-11 DIAGNOSIS — N52.9 ERECTILE DYSFUNCTION, UNSPECIFIED ERECTILE DYSFUNCTION TYPE: ICD-10-CM

## 2023-10-11 DIAGNOSIS — F90.9 ADULT ADHD (ATTENTION DEFICIT HYPERACTIVITY DISORDER): ICD-10-CM

## 2023-10-11 DIAGNOSIS — Z13.220 SCREENING FOR CHOLESTEROL LEVEL: ICD-10-CM

## 2023-10-11 DIAGNOSIS — Z87.891 HISTORY OF CIGARETTE SMOKING: ICD-10-CM

## 2023-10-11 DIAGNOSIS — Z00.01 ANNUAL VISIT FOR GENERAL ADULT MEDICAL EXAMINATION WITH ABNORMAL FINDINGS: Primary | ICD-10-CM

## 2023-10-11 RX ORDER — LISDEXAMFETAMINE DIMESYLATE CAPSULES 60 MG/1
60 CAPSULE ORAL EVERY MORNING
Qty: 30 CAPSULE | Refills: 0 | Status: SHIPPED | OUTPATIENT
Start: 2023-10-11

## 2023-10-11 RX ORDER — TADALAFIL 5 MG/1
10 TABLET ORAL DAILY PRN
Qty: 30 TABLET | Refills: 1 | Status: SHIPPED | OUTPATIENT
Start: 2023-10-11

## 2023-10-11 NOTE — ASSESSMENT & PLAN NOTE
Patient's (Body mass index is 33.15 kg/m².) indicates that they are obese (BMI >30) with health conditions that include none . Weight is worsening. BMI  is above average; BMI management plan is completed. We discussed portion control and increasing exercise.

## 2023-11-04 PROBLEM — N52.9 ERECTILE DYSFUNCTION: Status: ACTIVE | Noted: 2023-11-04

## 2023-11-14 DIAGNOSIS — F90.9 ADULT ADHD (ATTENTION DEFICIT HYPERACTIVITY DISORDER): ICD-10-CM

## 2023-11-15 RX ORDER — LISDEXAMFETAMINE DIMESYLATE CAPSULES 60 MG/1
60 CAPSULE ORAL EVERY MORNING
Qty: 30 CAPSULE | Refills: 0 | Status: SHIPPED | OUTPATIENT
Start: 2023-11-15

## 2023-11-28 NOTE — PROGRESS NOTES
"Chief Complaint  ADHD and Mouth Lesions    History of Present Illness  Bryan Rodgers presents today for cold sores and ADHD follow up    ADHD:  Symptoms include inattention, need for frequent task redirection, poor work performance, forgetfulness, careless mistakes, losing things, fidgeting, excessive talking, and interrupting others. The symptoms occur at home, at work, during social events, in the morning , in the afternoon, in the evening, and on weekends. Onset was about 10 years ago. The symptoms are described as Severe in severity. The symptoms are described as stable. Symptoms are exacerbated by fatigue, conversation, and mental effort. Symptoms are relieved by stimulant medication. Associated symptoms include anxiety disorder and social isolation. Current treatment includes Vyvanse. By report, Bryan is compliant most of the time.    Patient reports they have been experiencing cold sores on their lip and nose, onset of that was several years ago. and it confirmed Herpes Simplex Virus 1, in 2013. Last flare up was just a couple of weeks ago typically 4-5 times a year.  Usually right lower lip but have had flairs that include sores on nose. He is interested in medication today.      Patient also reports they had labs done downstairs on 11/2/2023     Influenza immunization was not given due to patient refusal.     Patient Care Team:  Esther Tafoya MD as PCP - General (Family Medicine)   Current Outpatient Medications on File Prior to Visit   Medication Sig    tadalafil (Cialis) 5 MG tablet Take 2 tablets by mouth Daily As Needed for Erectile Dysfunction.     No current facility-administered medications on file prior to visit.       Objective   Vital Signs:   /78 (BP Location: Right arm, Patient Position: Sitting, Cuff Size: Adult)   Pulse 78   Temp 98.6 °F (37 °C) (Temporal)   Resp 18   Ht 172.7 cm (68\")   Wt 98.9 kg (218 lb)   SpO2 98%   BMI 33.15 kg/m²    BP Readings from Last 3 Encounters: "   12/14/23 121/86   12/05/23 136/78   10/11/23 132/80     Wt Readings from Last 3 Encounters:   12/14/23 98.9 kg (218 lb)   12/05/23 98.9 kg (218 lb)   10/11/23 98.9 kg (218 lb)         Physical Exam  Vitals and nursing note reviewed.   Constitutional:       General: He is not in acute distress.     Appearance: Normal appearance. He is well-developed. He is not ill-appearing.   HENT:      Head: Normocephalic and atraumatic.      Right Ear: Tympanic membrane, ear canal and external ear normal. There is no impacted cerumen.      Left Ear: Tympanic membrane, ear canal and external ear normal. There is no impacted cerumen.      Mouth/Throat:      Mouth: Mucous membranes are moist.      Pharynx: Oropharynx is clear.   Eyes:      Conjunctiva/sclera: Conjunctivae normal.      Pupils: Pupils are equal, round, and reactive to light.   Neck:      Thyroid: No thyromegaly.      Vascular: No JVD.   Cardiovascular:      Rate and Rhythm: Normal rate and regular rhythm.      Heart sounds: Murmur (2/6 systolic ejection) heard.   Pulmonary:      Breath sounds: Normal breath sounds. No wheezing or rales.   Abdominal:      General: Bowel sounds are normal.      Tenderness: There is no abdominal tenderness.   Musculoskeletal:         General: Normal range of motion.      Cervical back: Normal range of motion.   Lymphadenopathy:      Cervical: No cervical adenopathy.   Skin:     General: Skin is warm and dry.      Findings: No rash.      Comments: No active herpetic lesions in the mouth, lips, or perioral area   Neurological:      Mental Status: He is alert and oriented to person, place, and time.   Psychiatric:         Mood and Affect: Mood normal.         Behavior: Behavior normal.         Thought Content: Thought content normal.         Judgment: Judgment normal.            No visits with results within 1 Day(s) from this visit.   Latest known visit with results is:   Results Encounter on 10/16/2023   Component Date Value Ref Range  Status    Testosterone, Total 11/02/2023 557  264 - 916 ng/dL Final    Comment: Adult male reference interval is based on a population of  healthy nonobese males (BMI <30) between 19 and 39 years old.  maye Mccullough.al. JCEM 2017,102;5674-5429. PMID: [id number].      Total Cholesterol 11/02/2023 197  100 - 199 mg/dL Final    Triglycerides 11/02/2023 129  0 - 149 mg/dL Final    HDL Cholesterol 11/02/2023 37 (L)  >39 mg/dL Final    VLDL Cholesterol Jaylen 11/02/2023 23  5 - 40 mg/dL Final    LDL Chol Calc (NIH) 11/02/2023 137 (H)  0 - 99 mg/dL Final    Glucose 11/02/2023 93  70 - 99 mg/dL Final    BUN 11/02/2023 10  6 - 24 mg/dL Final    Creatinine 11/02/2023 0.94  0.76 - 1.27 mg/dL Final    EGFR Result 11/02/2023 98  >59 mL/min/1.73 Final    BUN/Creatinine Ratio 11/02/2023 11  9 - 20 Final    Sodium 11/02/2023 143  134 - 144 mmol/L Final    Potassium 11/02/2023 4.2  3.5 - 5.2 mmol/L Final    Chloride 11/02/2023 105  96 - 106 mmol/L Final    Total CO2 11/02/2023 26  20 - 29 mmol/L Final    Calcium 11/02/2023 9.3  8.7 - 10.2 mg/dL Final    Total Protein 11/02/2023 7.0  6.0 - 8.5 g/dL Final    Albumin 11/02/2023 4.4  3.8 - 4.9 g/dL Final    Globulin 11/02/2023 2.6  1.5 - 4.5 g/dL Final    A/G Ratio 11/02/2023 1.7  1.2 - 2.2 Final    Total Bilirubin 11/02/2023 0.6  0.0 - 1.2 mg/dL Final    Alkaline Phosphatase 11/02/2023 60  44 - 121 IU/L Final    AST (SGOT) 11/02/2023 21  0 - 40 IU/L Final    ALT (SGPT) 11/02/2023 33  0 - 44 IU/L Final       Lab Results   Component Value Date    CHLPL 197 11/02/2023    TRIG 129 11/02/2023    HDL 37 (L) 11/02/2023     (H) 11/02/2023     Lab Results   Component Value Date    TSH 3.340 05/10/2022     Lab Results   Component Value Date    GLUCOSE 93 11/02/2023    BUN 10 11/02/2023    CREATININE 0.94 11/02/2023    EGFRIFNONA 92 04/20/2018    EGFRIFAFRI 106 04/20/2018    BCR 11 11/02/2023    K 4.2 11/02/2023    CO2 26 11/02/2023    CALCIUM 9.3 11/02/2023    PROTENTOTREF 7.0 11/02/2023     ALBUMIN 4.4 11/02/2023    LABIL2 1.7 11/02/2023    AST 21 11/02/2023    ALT 33 11/02/2023     Lab Results   Component Value Date    WBC 8.3 04/20/2018    HGB 16.0 04/20/2018    HCT 45.5 04/20/2018    MCV 90.1 04/20/2018     04/20/2018                      Assessment and Plan    Diagnoses and all orders for this visit:    1. Adult ADHD (attention deficit hyperactivity disorder) (Primary)  -     lisdexamfetamine (Vyvanse) 60 MG capsule; Take 1 capsule by mouth Every Morning  Dispense: 30 capsule; Refill: 0    2. Class 1 obesity due to excess calories without serious comorbidity with body mass index (BMI) of 33.0 to 33.9 in adult    3. Former smoker    4. Oral herpes  -     valACYclovir (Valtrex) 1000 MG tablet; 2 at 1st onset of symptoms and repeat in 12 hours for 2 doses.  Dispense: 12 tablet; Refill: 1      ADHD improved concentration and focus on Vyvanse 60 mg daily, renewing today    History of recurrent oral herpes.  Prescription for Valtrex 1000 mg to take 2 at first onset of prodromal symptoms and repeat in 12 hours as needed for flares/outbreaks.        Medications Discontinued During This Encounter   Medication Reason    lisdexamfetamine (Vyvanse) 60 MG capsule Reorder         Follow Up     Return in about 3 months (around 3/5/2024) for Recheck adhd.    Patient was given instructions and counseling regarding his condition or for health maintenance advice. Please see specific information pulled into the AVS if appropriate.

## 2023-12-05 ENCOUNTER — OFFICE VISIT (OUTPATIENT)
Dept: FAMILY MEDICINE CLINIC | Facility: CLINIC | Age: 53
End: 2023-12-05
Payer: COMMERCIAL

## 2023-12-05 VITALS
DIASTOLIC BLOOD PRESSURE: 78 MMHG | TEMPERATURE: 98.6 F | OXYGEN SATURATION: 98 % | SYSTOLIC BLOOD PRESSURE: 136 MMHG | WEIGHT: 218 LBS | BODY MASS INDEX: 33.04 KG/M2 | HEART RATE: 78 BPM | HEIGHT: 68 IN | RESPIRATION RATE: 18 BRPM

## 2023-12-05 DIAGNOSIS — E66.09 CLASS 1 OBESITY DUE TO EXCESS CALORIES WITHOUT SERIOUS COMORBIDITY WITH BODY MASS INDEX (BMI) OF 33.0 TO 33.9 IN ADULT: ICD-10-CM

## 2023-12-05 DIAGNOSIS — Z87.891 FORMER SMOKER: ICD-10-CM

## 2023-12-05 DIAGNOSIS — F90.9 ADULT ADHD (ATTENTION DEFICIT HYPERACTIVITY DISORDER): Primary | ICD-10-CM

## 2023-12-05 DIAGNOSIS — B00.2 ORAL HERPES: ICD-10-CM

## 2023-12-05 PROCEDURE — 99214 OFFICE O/P EST MOD 30 MIN: CPT | Performed by: FAMILY MEDICINE

## 2023-12-05 RX ORDER — VALACYCLOVIR HYDROCHLORIDE 1 G/1
TABLET, FILM COATED ORAL
Qty: 12 TABLET | Refills: 1 | Status: SHIPPED | OUTPATIENT
Start: 2023-12-05

## 2023-12-05 RX ORDER — LISDEXAMFETAMINE DIMESYLATE CAPSULES 60 MG/1
60 CAPSULE ORAL EVERY MORNING
Qty: 30 CAPSULE | Refills: 0 | Status: SHIPPED | OUTPATIENT
Start: 2023-12-05

## 2023-12-14 ENCOUNTER — OFFICE VISIT (OUTPATIENT)
Dept: FAMILY MEDICINE CLINIC | Facility: CLINIC | Age: 53
End: 2023-12-14
Payer: COMMERCIAL

## 2023-12-14 VITALS
WEIGHT: 218 LBS | SYSTOLIC BLOOD PRESSURE: 121 MMHG | HEIGHT: 68 IN | DIASTOLIC BLOOD PRESSURE: 86 MMHG | BODY MASS INDEX: 33.04 KG/M2 | HEART RATE: 76 BPM | TEMPERATURE: 97.7 F | OXYGEN SATURATION: 95 % | RESPIRATION RATE: 18 BRPM

## 2023-12-14 DIAGNOSIS — J20.9 ACUTE PURULENT BRONCHITIS: Primary | ICD-10-CM

## 2023-12-14 DIAGNOSIS — R05.9 COUGH, UNSPECIFIED TYPE: ICD-10-CM

## 2023-12-14 LAB
EXPIRATION DATE: NORMAL
FLUAV AG UPPER RESP QL IA.RAPID: NOT DETECTED
FLUBV AG UPPER RESP QL IA.RAPID: NOT DETECTED
INTERNAL CONTROL: NORMAL
INTERNAL CONTROL: NORMAL
Lab: NORMAL
RSV AG SPEC QL: NEGATIVE
S PYO AG THROAT QL: NEGATIVE
SARS-COV-2 AG UPPER RESP QL IA.RAPID: NOT DETECTED

## 2023-12-14 PROCEDURE — 99214 OFFICE O/P EST MOD 30 MIN: CPT | Performed by: NURSE PRACTITIONER

## 2023-12-14 PROCEDURE — 87428 SARSCOV & INF VIR A&B AG IA: CPT | Performed by: NURSE PRACTITIONER

## 2023-12-14 PROCEDURE — 87880 STREP A ASSAY W/OPTIC: CPT | Performed by: NURSE PRACTITIONER

## 2023-12-14 PROCEDURE — 87807 RSV ASSAY W/OPTIC: CPT | Performed by: NURSE PRACTITIONER

## 2023-12-14 RX ORDER — CEFDINIR 300 MG/1
300 CAPSULE ORAL EVERY 12 HOURS
Qty: 10 CAPSULE | Refills: 0 | Status: SHIPPED | OUTPATIENT
Start: 2023-12-14 | End: 2023-12-19

## 2023-12-14 RX ORDER — ALBUTEROL SULFATE 90 UG/1
AEROSOL, METERED RESPIRATORY (INHALATION)
Qty: 18 G | Refills: 0 | Status: SHIPPED | OUTPATIENT
Start: 2023-12-14

## 2023-12-14 RX ORDER — PREDNISONE 20 MG/1
20 TABLET ORAL 2 TIMES DAILY
Qty: 10 TABLET | Refills: 0 | Status: SHIPPED | OUTPATIENT
Start: 2023-12-14 | End: 2023-12-19

## 2023-12-14 NOTE — LETTER
December 14, 2023     Patient: Bryan Rodgers   YOB: 1970   Date of Visit: 12/14/2023       To Whom It May Concern:    It is my medical opinion that Bryan Rodgers be excused from work 12/11/23 to 12/15/23.  May return to work on 12/18/23.           Sincerely,        SAMIR Washington    CC: No Recipients   Major portion of history was provided by parent    Patient ID: Ralph Ellison is a 4 y.o. male.    Chief Complaint: Hx of repaired hypospadias      HPI:   Ralph is here today for a follow-up for urethrocutaneous fistula.. He was last seen August 12th and his mother sent me a picture or video of him voiding.  He had a good stream but about [facility] through the void there was a noticeable drip from the coronal area.  I felt that it was my area of concern from when I saw him previously.  He came today for an in-person examination.         Allergies: Patient has no known allergies.        Review of Systems   Genitourinary:  Positive for dysuria and penile pain. Negative for penile discharge, penile swelling and scrotal swelling.       Objective:   Physical Exam  He has a small fistula just to the right of the midline at the coronal area  Assessment:       1. Acquired urethrocutaneous fistula            Plan:   Ralph was seen today for hx of repaired hypospadias.    Diagnoses and all orders for this visit:    Acquired urethrocutaneous fistula      I discussed the fistula and its repair with his mom   We will plan October 6th to do a fistula repair         This note is dictated using M * MODAL Fluency Word Recognition Program.  There are word recognition mistakes which are occasionally missed on review   Please pardon this , this information is otherwise accurate

## 2023-12-14 NOTE — PROGRESS NOTES
Chief Complaint  Cough    Subjective          Bryan is a 53 y.o. male  who presents to [facility]     Patient Care Team:  Esther Tafoya MD as PCP - General (Family Medicine)     History of Present Illness  Bryan is here today for cough    Location: cough  Quality: productive  Severity: moderate  Duration: for 5 days  Timing: persistent, not getting better  Context: Former smoker  No diagnosed chronic lung problems  No ill contacts  No recent antibiotic use  Alleviating factors:  Nyquil, ibuprofen  Aggravating factors: nothing makes worse  Associated Symptoms: + fever, + wheezing    He thinks he has COVID.  He has not done a home test.  He has missed work this week. Needs a note for work.      Bryan Rodgers  has a past medical history of ADHD (attention deficit hyperactivity disorder) and Arthritis.      Review of Systems   Constitutional:  Positive for fever. Negative for fatigue.   HENT:  Positive for congestion and sinus pressure. Negative for ear pain and sore throat.    Respiratory:  Positive for cough (productive) and wheezing. Negative for shortness of breath.    Cardiovascular:  Negative for chest pain.   Gastrointestinal:  Negative for diarrhea, nausea and vomiting.   Skin:  Negative for rash.   Neurological:  Positive for headaches. Negative for dizziness.   Hematological:  Negative for adenopathy.        Family History   Problem Relation Age of Onset    Cancer Mother         Breast cancer    Other Father         CHF, heart transplant    Cancer Maternal Uncle         esophageal cancer    Cancer Paternal Aunt         liver cancer    Other Maternal Grandmother         glaucoma    Cancer Paternal Grandmother         Past Surgical History:   Procedure Laterality Date    SHOULDER SURGERY Right 2006    UMBILICAL HERNIA REPAIR          Social History     Socioeconomic History    Marital status: Single   Tobacco Use    Smoking status: Former     Packs/day: 1.00     Years: 25.00  "    Additional pack years: 0.00     Total pack years: 25.00     Types: Cigarettes     Start date: 1997     Quit date: 2022     Years since quittin.9    Smokeless tobacco: Never    Tobacco comments:     has quit several times. only smoked from 8547-0486 and then started 2020 until .   Vaping Use    Vaping Use: Never used   Substance and Sexual Activity    Alcohol use: Yes     Comment: occasionally    Drug use: Never    Sexual activity: Defer          There is no immunization history on file for this patient.    Objective       Current Outpatient Medications:     lisdexamfetamine (Vyvanse) 60 MG capsule, Take 1 capsule by mouth Every Morning, Disp: 30 capsule, Rfl: 0    tadalafil (Cialis) 5 MG tablet, Take 2 tablets by mouth Daily As Needed for Erectile Dysfunction., Disp: 30 tablet, Rfl: 1    valACYclovir (Valtrex) 1000 MG tablet, 2 at 1st onset of symptoms and repeat in 12 hours for 2 doses., Disp: 12 tablet, Rfl: 1    albuterol sulfate  (90 Base) MCG/ACT inhaler, 1 to 2 puffs inhalation every 4-6 hours as needed for cough and shortness of breath, Disp: 18 g, Rfl: 0    cefdinir (OMNICEF) 300 MG capsule, Take 1 capsule by mouth Every 12 (Twelve) Hours for 5 days., Disp: 10 capsule, Rfl: 0    predniSONE (DELTASONE) 20 MG tablet, Take 1 tablet by mouth 2 (Two) Times a Day for 5 days., Disp: 10 tablet, Rfl: 0    Vital Signs:      /86   Pulse 76   Temp 97.7 °F (36.5 °C) (Temporal)   Resp 18   Ht 172.7 cm (67.99\")   Wt 98.9 kg (218 lb)   SpO2 95%   BMI 33.15 kg/m²     Vitals:    23 1320   BP: 121/86   Pulse: 76   Resp: 18   Temp: 97.7 °F (36.5 °C)   TempSrc: Temporal   SpO2: 95%   Weight: 98.9 kg (218 lb)   Height: 172.7 cm (67.99\")      Physical Exam  Vitals reviewed.   Constitutional:       General: He is not in acute distress.     Appearance: Normal appearance. He is obese. He is not ill-appearing or toxic-appearing.   HENT:      Head: Normocephalic and atraumatic.      Right " Ear: Tympanic membrane, ear canal and external ear normal.      Left Ear: Tympanic membrane, ear canal and external ear normal.      Mouth/Throat:      Mouth: Mucous membranes are moist.      Pharynx: Oropharynx is clear. Posterior oropharyngeal erythema present. No oropharyngeal exudate.   Eyes:      General: No scleral icterus.     Conjunctiva/sclera: Conjunctivae normal.   Cardiovascular:      Rate and Rhythm: Normal rate and regular rhythm.      Heart sounds: Normal heart sounds.   Pulmonary:      Effort: Pulmonary effort is normal. No tachypnea, accessory muscle usage, respiratory distress or retractions.      Breath sounds: Decreased breath sounds, wheezing and rhonchi present.   Musculoskeletal:      Cervical back: Neck supple.      Right lower leg: No edema.      Left lower leg: No edema.   Lymphadenopathy:      Cervical: No cervical adenopathy.   Skin:     General: Skin is warm and dry.   Neurological:      Mental Status: He is alert and oriented to person, place, and time.   Psychiatric:         Mood and Affect: Mood normal.        Result Review :   The following data was reviewed by: SAMIR Washington on 12/14/2023:             Office Visit on 12/14/2023   Component Date Value Ref Range Status    SARS Antigen 12/14/2023 Not Detected  Not Detected, Presumptive Negative Final    Influenza A Antigen SCOTTY 12/14/2023 Not Detected  Not Detected Final    Influenza B Antigen SCOTTY 12/14/2023 Not Detected  Not Detected Final    Internal Control 12/14/2023 Passed  Passed Final    Lot Number 12/14/2023 [id number]   Final    Expiration Date 12/14/2023 11/2,024   Final    Rapid Strep A Screen 12/14/2023 Negative  Negative, VALID, INVALID, Not Performed Final    Internal Control 12/14/2023 Passed  Passed Final    Lot Number 12/14/2023 [id number]   Final    Expiration Date 12/14/2023 1/2,025   Final    RSV Rapid Ag 12/14/2023 Negative  Negative Final    Lot Number 12/14/2023 [id number]   Final    Expiration Date 12/14/2023  10/2,024   Final          Assessment and Plan    Diagnoses and all orders for this visit:    1. Acute purulent bronchitis (Primary)  -     cefdinir (OMNICEF) 300 MG capsule; Take 1 capsule by mouth Every 12 (Twelve) Hours for 5 days.  Dispense: 10 capsule; Refill: 0  -     albuterol sulfate  (90 Base) MCG/ACT inhaler; 1 to 2 puffs inhalation every 4-6 hours as needed for cough and shortness of breath  Dispense: 18 g; Refill: 0  -     predniSONE (DELTASONE) 20 MG tablet; Take 1 tablet by mouth 2 (Two) Times a Day for 5 days.  Dispense: 10 tablet; Refill: 0    2. Cough, unspecified type  -     POCT SARS-CoV-2 Antigen SCOTTY + Flu  -     POCT rapid strep A  -     POCT respiratory syncytial virus  -     XR Chest PA & Lateral       Begin antibiotic, prednisone and albuterol inhaler.  Ordered chest xray  Work note given.    Increase fluids. Tylenol and/or Ibuprofen for pain or fever.   Follow-up 5-7 days for reevaluation if not improved or sooner if needed.       Follow Up   Return if symptoms worsen or fail to improve.  Patient was given instructions and counseling regarding his condition or for health maintenance advice. Please see specific information pulled into the AVS if appropriate.    There are no Patient Instructions on file for this visit.

## 2024-01-22 DIAGNOSIS — F90.9 ADULT ADHD (ATTENTION DEFICIT HYPERACTIVITY DISORDER): ICD-10-CM

## 2024-01-22 RX ORDER — LISDEXAMFETAMINE DIMESYLATE CAPSULES 60 MG/1
60 CAPSULE ORAL EVERY MORNING
Qty: 30 CAPSULE | Refills: 0 | Status: SHIPPED | OUTPATIENT
Start: 2024-01-22

## 2024-02-22 DIAGNOSIS — F90.9 ADULT ADHD (ATTENTION DEFICIT HYPERACTIVITY DISORDER): ICD-10-CM

## 2024-02-22 DIAGNOSIS — N52.9 ERECTILE DYSFUNCTION, UNSPECIFIED ERECTILE DYSFUNCTION TYPE: ICD-10-CM

## 2024-02-23 RX ORDER — TADALAFIL 5 MG/1
10 TABLET ORAL DAILY PRN
Qty: 30 TABLET | Refills: 1 | Status: SHIPPED | OUTPATIENT
Start: 2024-02-23

## 2024-02-23 RX ORDER — LISDEXAMFETAMINE DIMESYLATE CAPSULES 60 MG/1
60 CAPSULE ORAL EVERY MORNING
Qty: 30 CAPSULE | Refills: 0 | Status: SHIPPED | OUTPATIENT
Start: 2024-02-23

## 2024-02-25 ENCOUNTER — PATIENT MESSAGE (OUTPATIENT)
Dept: FAMILY MEDICINE CLINIC | Facility: CLINIC | Age: 54
End: 2024-02-25
Payer: COMMERCIAL

## 2024-03-05 NOTE — PROGRESS NOTES
Chief Complaint  ADHD    History of Present Illness  Bryan Rodgers presents today for follow up on ADHD    ADHD:  Symptoms include inattention, forgetfulness, and avoiding mental tasks. The symptoms occur at home, at work, during social events, in the morning , in the afternoon, in the evening, and on weekends. Onset was since about 5-10 years ago. The symptoms are described as Moderate in severity. The symptoms are described as stable. Symptoms are exacerbated by work environment, distracting activities, and mental effort. Symptoms are relieved by stimulant medication. Associated symptoms include memory disorder. Current treatment includes Vyvanse 60 mg. By report, Bryan is compliant most of the time.    At 60 mg able to get through entire shift before med wears of, was not the case with lower doses.     Did have a outbreak of cold sores did use valtrex    Treating allergies with astapro is very helpful.     Started seeing a chiropractor, Dr Hastings, 3 weeks ago for neck and back pain and hip pain. Currently twice a week.     Influenza immunization was not given due to patient refusal.      Patient Care Team:  Esther Tafoya MD as PCP - General (Family Medicine)   Current Outpatient Medications on File Prior to Visit   Medication Sig    lisdexamfetamine (Vyvanse) 60 MG capsule Take 1 capsule by mouth Every Morning    tadalafil (Cialis) 5 MG tablet Take 2 tablets by mouth Daily As Needed for Erectile Dysfunction.    [DISCONTINUED] valACYclovir (Valtrex) 1000 MG tablet 2 at 1st onset of symptoms and repeat in 12 hours for 2 doses.    albuterol sulfate  (90 Base) MCG/ACT inhaler 1 to 2 puffs inhalation every 4-6 hours as needed for cough and shortness of breath (Patient not taking: Reported on 3/12/2024)    azelastine (ASTEPRO) 0.15 % solution nasal spray 2 sprays into the nostril(s) as directed by provider Daily.    olopatadine (PATANOL) 0.1 % ophthalmic solution Administer 1 drop to both eyes 2 (Two) Times  "a Day.     No current facility-administered medications on file prior to visit.       Objective   Vital Signs:   /76 (BP Location: Right arm, Patient Position: Sitting, Cuff Size: Large Adult)   Pulse 83   Temp 97.7 °F (36.5 °C) (Temporal)   Resp 18   Ht 172.7 cm (68\")   Wt 99.3 kg (219 lb)   SpO2 97%   BMI 33.30 kg/m²    BP Readings from Last 3 Encounters:   03/12/24 120/76   12/14/23 121/86   12/05/23 136/78     Wt Readings from Last 3 Encounters:   03/12/24 99.3 kg (219 lb)   12/14/23 98.9 kg (218 lb)   12/05/23 98.9 kg (218 lb)         Physical Exam  Vitals and nursing note reviewed.   Constitutional:       General: He is not in acute distress.     Appearance: Normal appearance. He is well-developed. He is not ill-appearing.   HENT:      Head: Normocephalic and atraumatic.      Nose: Nose normal.      Comments: Nasal mucosa is boggy     Mouth/Throat:      Mouth: Mucous membranes are moist.      Pharynx: No oropharyngeal exudate or posterior oropharyngeal erythema.      Comments: Mild cobblestoning of posterior pharynx  Eyes:      Conjunctiva/sclera: Conjunctivae normal.      Pupils: Pupils are equal, round, and reactive to light.   Neck:      Thyroid: No thyromegaly.      Vascular: No JVD.   Cardiovascular:      Rate and Rhythm: Normal rate and regular rhythm.      Heart sounds: Murmur heard.      Comments: 2/6 systolic ejection murmur  Pulmonary:      Breath sounds: Normal breath sounds. No wheezing or rales.   Musculoskeletal:         General: Normal range of motion.      Cervical back: Normal range of motion.   Lymphadenopathy:      Cervical: No cervical adenopathy.   Skin:     General: Skin is warm and dry.      Findings: No rash.   Neurological:      Mental Status: He is alert and oriented to person, place, and time.   Psychiatric:         Mood and Affect: Mood normal.         Behavior: Behavior normal.            No visits with results within 1 Day(s) from this visit.   Latest known visit with " results is:   Office Visit on 12/14/2023   Component Date Value Ref Range Status    SARS Antigen 12/14/2023 Not Detected  Not Detected, Presumptive Negative Final    Influenza A Antigen SCOTTY 12/14/2023 Not Detected  Not Detected Final    Influenza B Antigen SCOTTY 12/14/2023 Not Detected  Not Detected Final    Internal Control 12/14/2023 Passed  Passed Final    Lot Number 12/14/2023 [id number]   Final    Expiration Date 12/14/2023 11/2,024   Final    Rapid Strep A Screen 12/14/2023 Negative  Negative, VALID, INVALID, Not Performed Final    Internal Control 12/14/2023 Passed  Passed Final    Lot Number 12/14/2023 [id number]   Final    Expiration Date 12/14/2023 1/2,025   Final    RSV Rapid Ag 12/14/2023 Negative  Negative Final    Lot Number 12/14/2023 [id number]   Final    Expiration Date 12/14/2023 10/2,024   Final       Lab Results   Component Value Date    CHLPL 197 11/02/2023    TRIG 129 11/02/2023    HDL 37 (L) 11/02/2023     (H) 11/02/2023     Lab Results   Component Value Date    TSH 3.340 05/10/2022     Lab Results   Component Value Date    GLUCOSE 93 11/02/2023    BUN 10 11/02/2023    CREATININE 0.94 11/02/2023    EGFRIFNONA 92 04/20/2018    EGFRIFAFRI 106 04/20/2018    BCR 11 11/02/2023    K 4.2 11/02/2023    CO2 26 11/02/2023    CALCIUM 9.3 11/02/2023    PROTENTOTREF 7.0 11/02/2023    ALBUMIN 4.4 11/02/2023    LABIL2 1.7 11/02/2023    AST 21 11/02/2023    ALT 33 11/02/2023     Lab Results   Component Value Date    WBC 8.3 04/20/2018    HGB 16.0 04/20/2018    HCT 45.5 04/20/2018    MCV 90.1 04/20/2018     04/20/2018                      Assessment and Plan    Diagnoses and all orders for this visit:    1. Adult ADHD (attention deficit hyperactivity disorder) (Primary)  -     Ambulatory Referral to Sleep Medicine    2. Class 1 obesity due to excess calories without serious comorbidity with body mass index (BMI) of 33.0 to 33.9 in adult    3. Former smoker    4. Oral herpes  -     valACYclovir (Valtrex)  1000 MG tablet; 2 at 1st onset of symptoms and repeat in 12 hours for 2 doses.  Dispense: 12 tablet; Refill: 1    5. Sleep apnea in adult    ADHD significantly improved with 60 mg Vyvanse, continue, does not need refill today.  History of oral herpes simplex, Valtrex for as needed use  Allergic rhinitis, patient self initiated last appointment with significant benefit he is also using Pataday for eye allergy symptoms both with significant improvement  Patient reports a history of mild sleep apnea diagnosed around 2001, at that time he could not afford a CPAP machine and has never had treatment.  He is asking for reevaluation and possible treatment at this time.  Referring to sleep medicine.  Intermittent musculoskeletal issues related to posture and work.  He is getting improvement with neck stiffness with chiropractor, advised to follow plan established by Dr. Hastings and avoid postural triggers such as hanging head to look at phone in lap        Medications Discontinued During This Encounter   Medication Reason    valACYclovir (Valtrex) 1000 MG tablet Reorder         Follow Up     Return in about 3 months (around 6/12/2024) for adhd.    Patient was given instructions and counseling regarding his condition or for health maintenance advice. Please see specific information pulled into the AVS if appropriate.

## 2024-03-12 ENCOUNTER — OFFICE VISIT (OUTPATIENT)
Dept: FAMILY MEDICINE CLINIC | Facility: CLINIC | Age: 54
End: 2024-03-12
Payer: COMMERCIAL

## 2024-03-12 VITALS
BODY MASS INDEX: 33.19 KG/M2 | HEIGHT: 68 IN | OXYGEN SATURATION: 97 % | DIASTOLIC BLOOD PRESSURE: 76 MMHG | TEMPERATURE: 97.7 F | WEIGHT: 219 LBS | SYSTOLIC BLOOD PRESSURE: 120 MMHG | HEART RATE: 83 BPM | RESPIRATION RATE: 18 BRPM

## 2024-03-12 DIAGNOSIS — F90.9 ADULT ADHD (ATTENTION DEFICIT HYPERACTIVITY DISORDER): Primary | ICD-10-CM

## 2024-03-12 DIAGNOSIS — B00.2 ORAL HERPES: ICD-10-CM

## 2024-03-12 DIAGNOSIS — G47.30 SLEEP APNEA IN ADULT: ICD-10-CM

## 2024-03-12 DIAGNOSIS — Z87.891 FORMER SMOKER: ICD-10-CM

## 2024-03-12 DIAGNOSIS — E66.09 CLASS 1 OBESITY DUE TO EXCESS CALORIES WITHOUT SERIOUS COMORBIDITY WITH BODY MASS INDEX (BMI) OF 33.0 TO 33.9 IN ADULT: ICD-10-CM

## 2024-03-12 PROCEDURE — 99214 OFFICE O/P EST MOD 30 MIN: CPT | Performed by: FAMILY MEDICINE

## 2024-03-12 RX ORDER — AZELASTINE HCL 205.5 UG/1
2 SPRAY NASAL DAILY
COMMUNITY

## 2024-03-12 RX ORDER — OLOPATADINE HYDROCHLORIDE 1 MG/ML
1 SOLUTION/ DROPS OPHTHALMIC 2 TIMES DAILY
COMMUNITY

## 2024-03-12 RX ORDER — VALACYCLOVIR HYDROCHLORIDE 1 G/1
TABLET, FILM COATED ORAL
Qty: 12 TABLET | Refills: 1 | Status: SHIPPED | OUTPATIENT
Start: 2024-03-12

## 2024-04-05 DIAGNOSIS — F90.9 ADULT ADHD (ATTENTION DEFICIT HYPERACTIVITY DISORDER): ICD-10-CM

## 2024-04-05 RX ORDER — LISDEXAMFETAMINE DIMESYLATE CAPSULES 60 MG/1
60 CAPSULE ORAL EVERY MORNING
Qty: 30 CAPSULE | Refills: 0 | Status: SHIPPED | OUTPATIENT
Start: 2024-04-05

## 2024-05-07 DIAGNOSIS — F90.9 ADULT ADHD (ATTENTION DEFICIT HYPERACTIVITY DISORDER): ICD-10-CM

## 2024-05-07 RX ORDER — LISDEXAMFETAMINE DIMESYLATE 60 MG/1
60 CAPSULE ORAL EVERY MORNING
Qty: 30 CAPSULE | Refills: 0 | Status: SHIPPED | OUTPATIENT
Start: 2024-05-07

## 2024-06-14 DIAGNOSIS — F90.9 ADULT ADHD (ATTENTION DEFICIT HYPERACTIVITY DISORDER): ICD-10-CM

## 2024-06-14 RX ORDER — LISDEXAMFETAMINE DIMESYLATE 60 MG/1
60 CAPSULE ORAL EVERY MORNING
Qty: 30 CAPSULE | Refills: 0 | Status: SHIPPED | OUTPATIENT
Start: 2024-06-14

## 2024-09-03 NOTE — PROGRESS NOTES
Chief Complaint  ADHD    History of Present Illness  Bryan Rodgers presents today for follow up on ADHD      ADHD:  Symptoms include inattention, forgetfulness, and avoiding mental tasks. The symptoms occur at home, at work, during social events, in the morning, in the afternoon, in the evening, and on weekends. Onset was since about 5-10 years ago. The symptoms are described as Moderate in severity. The symptoms are described as stable. Symptoms are exacerbated by work environment, distracting activities, and mental effort. Symptoms are relieved by stimulant medication. Associated symptoms include memory disorder. Current treatment includes Vyvanse 60 mg. By report, Bryan is compliant most of the time.    Patient Care Team:  Esther Tafoya MD as PCP - General (Family Medicine)   Current Outpatient Medications on File Prior to Visit   Medication Sig    [DISCONTINUED] lisdexamfetamine (Vyvanse) 60 MG capsule Take 1 capsule by mouth Every Morning (Patient taking differently: Take 1 capsule by mouth Every Night)    [DISCONTINUED] tadalafil (Cialis) 5 MG tablet Take 2 tablets by mouth Daily As Needed for Erectile Dysfunction.    [DISCONTINUED] valACYclovir (Valtrex) 1000 MG tablet 2 at 1st onset of symptoms and repeat in 12 hours for 2 doses.    albuterol sulfate  (90 Base) MCG/ACT inhaler 1 to 2 puffs inhalation every 4-6 hours as needed for cough and shortness of breath (Patient not taking: Reported on 3/12/2024)    azelastine (ASTEPRO) 0.15 % solution nasal spray 2 sprays into the nostril(s) as directed by provider Daily. (Patient not taking: Reported on 9/4/2024)    olopatadine (PATANOL) 0.1 % ophthalmic solution Administer 1 drop to both eyes 2 (Two) Times a Day. (Patient not taking: Reported on 9/4/2024)     No current facility-administered medications on file prior to visit.       Objective   Vital Signs:   /80 (BP Location: Right arm, Patient Position: Sitting, Cuff Size: Large Adult)   Pulse  "85   Temp 97.8 °F (36.6 °C) (Temporal)   Resp 18   Ht 172.7 cm (68\")   Wt 98.4 kg (217 lb)   SpO2 98%   BMI 32.99 kg/m²    BP Readings from Last 3 Encounters:   09/04/24 132/80   03/12/24 120/76   12/14/23 121/86     Wt Readings from Last 3 Encounters:   09/04/24 98.4 kg (217 lb)   03/12/24 99.3 kg (219 lb)   12/14/23 98.9 kg (218 lb)         Physical Exam  Vitals and nursing note reviewed.   Constitutional:       General: He is not in acute distress.     Appearance: Normal appearance. He is well-developed. He is obese. He is not ill-appearing.   HENT:      Head: Normocephalic and atraumatic.   Eyes:      Pupils: Pupils are equal, round, and reactive to light.   Cardiovascular:      Rate and Rhythm: Regular rhythm.      Heart sounds: No murmur heard.  Pulmonary:      Breath sounds: Normal breath sounds. No wheezing or rales.   Skin:     General: Skin is warm and dry.      Findings: No rash.   Neurological:      Mental Status: He is alert and oriented to person, place, and time.            No visits with results within 1 Day(s) from this visit.   Latest known visit with results is:   Office Visit on 12/14/2023   Component Date Value Ref Range Status    SARS Antigen 12/14/2023 Not Detected  Not Detected, Presumptive Negative Final    Influenza A Antigen SCOTTY 12/14/2023 Not Detected  Not Detected Final    Influenza B Antigen SCOTTY 12/14/2023 Not Detected  Not Detected Final    Internal Control 12/14/2023 Passed  Passed Final    Lot Number 12/14/2023 [id number]   Final    Expiration Date 12/14/2023 11/2,024   Final    Rapid Strep A Screen 12/14/2023 Negative  Negative, VALID, INVALID, Not Performed Final    Internal Control 12/14/2023 Passed  Passed Final    Lot Number 12/14/2023 [id number]   Final    Expiration Date 12/14/2023 1/2,025   Final    RSV Rapid Ag 12/14/2023 Negative  Negative Final    Lot Number 12/14/2023 [id number]   Final    Expiration Date 12/14/2023 10/2,024   Final       Lab Results   Component Value Date "    CHLPL 197 11/02/2023    TRIG 129 11/02/2023    HDL 37 (L) 11/02/2023     (H) 11/02/2023     Lab Results   Component Value Date    TSH 3.340 05/10/2022     Lab Results   Component Value Date    GLUCOSE 93 11/02/2023    BUN 10 11/02/2023    CREATININE 0.94 11/02/2023    EGFRIFNONA 92 04/20/2018    EGFRIFAFRI 106 04/20/2018    BCR 11 11/02/2023    K 4.2 11/02/2023    CO2 26 11/02/2023    CALCIUM 9.3 11/02/2023    PROTENTOTREF 7.0 11/02/2023    ALBUMIN 4.4 11/02/2023    LABIL2 1.7 11/02/2023    AST 21 11/02/2023    ALT 33 11/02/2023     Lab Results   Component Value Date    WBC 8.3 04/20/2018    HGB 16.0 04/20/2018    HCT 45.5 04/20/2018    MCV 90.1 04/20/2018     04/20/2018                      Assessment and Plan    Diagnoses and all orders for this visit:    1. Adult ADHD (attention deficit hyperactivity disorder) (Primary)  -     lisdexamfetamine (Vyvanse) 60 MG capsule; Take 1 capsule by mouth Every Night Prior to work shift  Dispense: 30 capsule; Refill: 0    2. Oral herpes  -     valACYclovir (Valtrex) 1000 MG tablet; 2 at 1st onset of symptoms and repeat in 12 hours for 2 doses.  Dispense: 12 tablet; Refill: 1    3. Erectile dysfunction, unspecified erectile dysfunction type  -     tadalafil (Cialis) 5 MG tablet; 2-4 daily as needed for ED  Dispense: 30 tablet; Refill: 2    4. Class 1 obesity with body mass index (BMI) of 32.0 to 32.9 in adult, unspecified obesity type, unspecified whether serious comorbidity present    5. Former smoker      ADHD, off of medication.  Patient reports he is forgetting things at work, not motivated and grade B.  When on medicine he was more focused and less irritable.  Restarting Vyvanse at effective dose of 60 mg, lower doses were not effective.  He will take that before his evening work shift.    Erectile dysfunction, patient states 10 mg Cialis has not been effective and is inquiring about increasing the dose.  We will increase to a max of 20 mg as needed, he  states this is not covered by insurance so we will keep at the 5 mg strength due to cost.    Patient is also needing refill on Valtrex, he takes this at first hand of a cold sore developing and is typically effective within a couple days.    Preventative health, patient is not planning on getting any COVID vaccinations and I am removing this from his care gap.  He will continue a shingles shot in the future, did encourage getting a influenza and tetanus shot.  Additionally discussed recommendation for lung cancer screening.  Patient stopped smoking about 2 years ago.  He will check insurance to ensure this is covered and we may order this at upcoming annual exam.  Additionally he is interested in rechecking testosterone due to erectile dysfunction.  We will likely check testosterone, PSA along with lipid panel and CMP but he does not want the orders at this time.    Medications Discontinued During This Encounter   Medication Reason    tadalafil (Cialis) 5 MG tablet Reorder    valACYclovir (Valtrex) 1000 MG tablet Reorder    lisdexamfetamine (Vyvanse) 60 MG capsule Reorder         Follow Up     Return in about 2 months (around 11/4/2024) for Annual physical and add recheck.    Patient was given instructions and counseling regarding his condition or for health maintenance advice. Please see specific information pulled into the AVS if appropriate.

## 2024-09-04 ENCOUNTER — OFFICE VISIT (OUTPATIENT)
Dept: FAMILY MEDICINE CLINIC | Facility: CLINIC | Age: 54
End: 2024-09-04
Payer: COMMERCIAL

## 2024-09-04 VITALS
SYSTOLIC BLOOD PRESSURE: 132 MMHG | HEART RATE: 85 BPM | TEMPERATURE: 97.8 F | WEIGHT: 217 LBS | RESPIRATION RATE: 18 BRPM | OXYGEN SATURATION: 98 % | BODY MASS INDEX: 32.89 KG/M2 | DIASTOLIC BLOOD PRESSURE: 80 MMHG | HEIGHT: 68 IN

## 2024-09-04 DIAGNOSIS — N52.9 ERECTILE DYSFUNCTION, UNSPECIFIED ERECTILE DYSFUNCTION TYPE: ICD-10-CM

## 2024-09-04 DIAGNOSIS — F90.9 ADULT ADHD (ATTENTION DEFICIT HYPERACTIVITY DISORDER): Primary | ICD-10-CM

## 2024-09-04 DIAGNOSIS — Z87.891 FORMER SMOKER: ICD-10-CM

## 2024-09-04 DIAGNOSIS — B00.2 ORAL HERPES: ICD-10-CM

## 2024-09-04 DIAGNOSIS — E66.9 CLASS 1 OBESITY WITH BODY MASS INDEX (BMI) OF 32.0 TO 32.9 IN ADULT, UNSPECIFIED OBESITY TYPE, UNSPECIFIED WHETHER SERIOUS COMORBIDITY PRESENT: ICD-10-CM

## 2024-09-04 PROCEDURE — 99214 OFFICE O/P EST MOD 30 MIN: CPT | Performed by: FAMILY MEDICINE

## 2024-09-04 RX ORDER — LISDEXAMFETAMINE DIMESYLATE 60 MG/1
60 CAPSULE ORAL NIGHTLY
Qty: 30 CAPSULE | Refills: 0 | Status: SHIPPED | OUTPATIENT
Start: 2024-09-04

## 2024-09-04 RX ORDER — VALACYCLOVIR HYDROCHLORIDE 1 G/1
TABLET, FILM COATED ORAL
Qty: 12 TABLET | Refills: 1 | Status: SHIPPED | OUTPATIENT
Start: 2024-09-04

## 2024-09-04 RX ORDER — TADALAFIL 5 MG/1
TABLET ORAL
Qty: 30 TABLET | Refills: 2 | Status: SHIPPED | OUTPATIENT
Start: 2024-09-04

## 2024-09-04 NOTE — ASSESSMENT & PLAN NOTE
Patient's (Body mass index is 32.99 kg/m².) indicates that they are obese (BMI >30) with health conditions that include none . Weight is unchanged. BMI  is above average; BMI management plan is completed. We discussed portion control and increasing exercise.

## 2024-10-21 DIAGNOSIS — F90.9 ADULT ADHD (ATTENTION DEFICIT HYPERACTIVITY DISORDER): ICD-10-CM

## 2024-10-22 RX ORDER — LISDEXAMFETAMINE DIMESYLATE 60 MG/1
60 CAPSULE ORAL NIGHTLY
Qty: 30 CAPSULE | Refills: 0 | Status: SHIPPED | OUTPATIENT
Start: 2024-10-22

## 2024-11-23 DIAGNOSIS — F90.9 ADULT ADHD (ATTENTION DEFICIT HYPERACTIVITY DISORDER): ICD-10-CM

## 2024-11-25 RX ORDER — LISDEXAMFETAMINE DIMESYLATE 60 MG/1
60 CAPSULE ORAL NIGHTLY
Qty: 30 CAPSULE | Refills: 0 | Status: SHIPPED | OUTPATIENT
Start: 2024-11-25 | End: 2024-11-27 | Stop reason: SDUPTHER

## 2024-11-25 NOTE — TELEPHONE ENCOUNTER
Please inform patient that I am renewing Vyvanse however he did miss appointment on November 4 and his next appointment on January 6 is scheduled as an annual, not follow-up on ADHD.  Need to correct reason for visit (it is also incorrectly scheduled as physical with Pap and I am pretty sure that is not going to be possible…)

## 2024-11-25 NOTE — TELEPHONE ENCOUNTER
"Updated appointment type to a standard physical and put \"ADHD\" in appt notes per provider request  "

## 2024-11-27 DIAGNOSIS — F90.9 ADULT ADHD (ATTENTION DEFICIT HYPERACTIVITY DISORDER): ICD-10-CM

## 2024-11-27 RX ORDER — LISDEXAMFETAMINE DIMESYLATE 60 MG/1
60 CAPSULE ORAL NIGHTLY
Qty: 30 CAPSULE | Refills: 0 | Status: SHIPPED | OUTPATIENT
Start: 2024-11-27

## 2024-12-27 DIAGNOSIS — F90.9 ADULT ADHD (ATTENTION DEFICIT HYPERACTIVITY DISORDER): ICD-10-CM

## 2024-12-27 RX ORDER — LISDEXAMFETAMINE DIMESYLATE 60 MG/1
60 CAPSULE ORAL NIGHTLY
Qty: 30 CAPSULE | Refills: 0 | Status: SHIPPED | OUTPATIENT
Start: 2024-12-27

## 2025-01-10 NOTE — PROGRESS NOTES
Chief Complaint   Patient presents with    Annual Exam    ADHD       History of Present Illness  Subjective   Bryan Rodgers is a 54 y.o. male.     History of Present Illness  The patient is a 54-year-old male who presents for an annual physical and follow-up on ADHD. He is accompanied by his girlfriend.    He has no additional concerns today. He has undergone a Cologuard test, which was negative on 09/15/2022 and states he will repeat when due at the [facility]-year [facility]ino in September.  He has a history of smoking half a pack per day for several years, followed by a 14-year cessation period. He resumed smoking approximately 10 years ago, consuming close to a pack per day, but quit again within the past 1 to 2 years equating to approximately a 15-pack-year history.  He is not interested in lung cancer screening. He has declined all vaccinations, including the shingles vaccine. His last tetanus shot was administered approximately 15 years ago. He reports no chest pain, shortness of breath, or abdominal symptoms. He has experienced a weight gain of 10 pounds since September 2024.  Starting 4 days ago he and his girlfriend are committed to improving dietary habits.  His diet is generally balanced, with a focus on high protein and low carbohydrates. He has eliminated cookies, ice cream, and cereal from his diet. He does not engage in regular exercise.    He is currently on Vyvanse 60 mg for ADHD, which provides appetite suppression for a few hours post-administration. However, he experiences hunger towards the end of his work shift or after 8 hours. He has been experiencing difficulty obtaining Vyvanse from [employer] and [employer]. He has expressed a preference for Adderall immediate release, which he found more effective than Vyvanse. He recalls taking Adderall immediate release twice daily, but is uncertain about the dosage.  Reviewing records he took 20 mg of the extended release 2 in the morning or 20 mg q immediate release twice  daily both with better result from current Vyvanse    SOCIAL HISTORY  The patient quit smoking a year or two ago. He has a history of smoking half a pack a day for many years, then quit for 14 years, and resumed smoking closer to a pack a day for about 10 more years. He does not engage in regular exercise.    MEDICATIONS  Current: Vyvanse  Past: Adderall    IMMUNIZATIONS  The patient has refused any and all vaccinations, including the shingles vaccine. He has not had a tetanus shot in the past 15 years.    The patient is here: for coordination of medical care.  Last comprehensive physical was on 10/11/23.  Previous physical was performed by  Esther Tafoya MD  Overall has: minimal activity with work/home activities, good appetite, feels well with no complaints, decreased energy level, and is sleeping poorly. Nutrition: balanced diet. Last tetanus shot was unknown.     ADHD:  Symptoms include inattention, forgetfulness, and avoiding mental tasks. The symptoms occur at home, at work, during social events, in the morning, in the afternoon, in the evening, and on weekends. Onset was since about 5-10 years ago. The symptoms are described as Moderate in severity. The symptoms are described as stable. Symptoms are exacerbated by work environment, distracting activities, and mental effort. Symptoms are relieved by stimulant medication. Associated symptoms include memory disorder. Current treatment includes Vyvanse. By report, Bryan is compliant most of the time.    Health Maintenance   Topic Date Due    TDAP/TD VACCINES (1 - Tdap) Never done    ANNUAL PHYSICAL  10/11/2024    ZOSTER VACCINE (1 of 2) 01/13/2025 (Originally 4/14/2022)    INFLUENZA VACCINE  03/31/2025 (Originally 7/1/2024)    LUNG CANCER SCREENING  01/13/2026 (Originally 4/14/2022)    BMI FOLLOWUP  09/04/2025    COLORECTAL CANCER SCREENING  09/15/2025    HEPATITIS C SCREENING  Completed    Pneumococcal Vaccine 0-64  Aged Out    COVID-19 Vaccine   Discontinued       PHQ-9 Depression Screening  Little interest or pleasure in doing things? Not at all   Feeling down, depressed, or hopeless? Not at all   Trouble falling or staying asleep, or sleeping too much?     Feeling tired or having little energy?     Poor appetite or overeating?     Feeling bad about yourself - or that you are a failure or have let yourself or your family down?     Trouble concentrating on things, such as reading the newspaper or watching television?     Moving or speaking so slowly that other people could have noticed? Or the opposite - being so fidgety or restless that you have been moving around a lot more than usual?     Thoughts that you would be better off dead, or of hurting yourself in some way?     PHQ-9 Total Score     If you checked off any problems, how difficult have these problems made it for you to do your work, take care of things at home, or get along with other people?               Recent Hospitalizations:  No hospitalization(s) within the last year..  ccc      I personally reviewed and updated the patient's allergies, medications, problem list, and past medical, surgical, social, and family history. I have reviewed and confirmed the accuracy of the HPI and ROS as documented by the MA/LPN/RN Esther Tafoya MD    Family History   Problem Relation Age of Onset    Cancer Mother         Breast cancer    Other Father         CHF, heart transplant    Cancer Maternal Uncle         esophageal cancer    Cancer Paternal Aunt         liver cancer    Other Maternal Grandmother         glaucoma    Cancer Paternal Grandmother        Social History     Tobacco Use    Smoking status: Former     Current packs/day: 0.00     Average packs/day: 1 pack/day for 25.0 years (25.0 ttl pk-yrs)     Types: Cigarettes     Start date: 1/1/1997     Quit date: 1/2/2022     Years since quitting: 3.0    Smokeless tobacco: Never    Tobacco comments:     has quit several times. only smoked from  [id number] and then started 2020 until 2022.   Vaping Use    Vaping status: Never Used   Substance Use Topics    Alcohol use: Yes     Comment: occasionally    Drug use: Never       Past Surgical History:   Procedure Laterality Date    SHOULDER SURGERY Right 2006    UMBILICAL HERNIA REPAIR         Patient Active Problem List   Diagnosis    History of cigarette smoking    Elevated BP without diagnosis of hypertension    Chronic pain of right hip    Family history of early CAD    Adult ADHD (attention deficit hyperactivity disorder)    Class 1 obesity due to excess calories without serious comorbidity with body mass index (BMI) of 33.0 to 33.9 in adult    Primary osteoarthritis of right hip    Annual visit for general adult medical examination with abnormal findings    Erectile dysfunction         Current Outpatient Medications:     lisdexamfetamine (Vyvanse) 60 MG capsule, Take 1 capsule by mouth Every Night Prior to work shift, Disp: 30 capsule, Rfl: 0    tadalafil (Cialis) 5 MG tablet, 2-4 daily as needed for ED, Disp: 30 tablet, Rfl: 2    valACYclovir (Valtrex) 1000 MG tablet, 2 at 1st onset of symptoms and repeat in 12 hours for 2 doses., Disp: 12 tablet, Rfl: 1    albuterol sulfate  (90 Base) MCG/ACT inhaler, 1 to 2 puffs inhalation every 4-6 hours as needed for cough and shortness of breath (Patient not taking: Reported on 1/13/2025), Disp: 18 g, Rfl: 0    amphetamine-dextroamphetamine (Adderall) 20 MG tablet, Take 1 tablet by mouth 2 (Two) Times a Day., Disp: 60 tablet, Rfl: 0    azelastine (ASTEPRO) 0.15 % solution nasal spray, 2 sprays into the nostril(s) as directed by provider Daily. (Patient not taking: Reported on 1/13/2025), Disp: , Rfl:     olopatadine (PATANOL) 0.1 % ophthalmic solution, Administer 1 drop to both eyes 2 (Two) Times a Day. (Patient not taking: Reported on 1/13/2025), Disp: , Rfl:     Objective   /86 (BP Location: Left arm, Patient Position: Sitting, Cuff Size: Large Adult)   " Pulse 83   Temp 97.8 °F (36.6 °C) (Temporal)   Resp 18   Ht 175.3 cm (69\")   Wt 103 kg (227 lb 6.4 oz)   SpO2 95%   BMI 33.58 kg/m²   BP Readings from Last 3 Encounters:   01/13/25 132/86   09/04/24 132/80   03/12/24 120/76     Wt Readings from Last 3 Encounters:   01/13/25 103 kg (227 lb 6.4 oz)   09/04/24 98.4 kg (217 lb)   03/12/24 99.3 kg (219 lb)     Physical Exam  Vitals and nursing note reviewed.   Constitutional:       General: He is not in acute distress.     Appearance: Normal appearance. He is well-developed.   HENT:      Head: Normocephalic and atraumatic.      Mouth/Throat:      Mouth: Mucous membranes are moist.      Pharynx: Oropharynx is clear.   Eyes:      Conjunctiva/sclera: Conjunctivae normal.      Pupils: Pupils are equal, round, and reactive to light.   Neck:      Thyroid: No thyromegaly.      Vascular: No JVD.      Comments: No thyromegaly or palpable thyroid masses  Cardiovascular:      Rate and Rhythm: Normal rate and regular rhythm.      Heart sounds: Normal heart sounds. No murmur heard.  Pulmonary:      Breath sounds: Normal breath sounds. No wheezing or rales.   Abdominal:      General: Bowel sounds are normal.      Palpations: Abdomen is soft.      Tenderness: There is no abdominal tenderness.   Musculoskeletal:         General: Normal range of motion.      Cervical back: Normal range of motion and neck supple. No tenderness.   Lymphadenopathy:      Cervical: No cervical adenopathy.   Skin:     General: Skin is warm and dry.      Findings: No rash.   Neurological:      Mental Status: He is alert and oriented to person, place, and time.   Psychiatric:         Mood and Affect: Mood normal.         Behavior: Behavior normal.         Physical Exam      Data / Lab Results:  No results found for: \"HGBA1C\"        Results  Laboratory Studies  Cologuard test was negative on 09/15/2022.    Age-appropriate Screening Schedule:  Refer to the list below for future screening recommendations " based on patient's age, sex and/or medical conditions. Orders for these recommended tests are listed in the plan section. The patient has been provided with a written plan.        Assessment & Plan      Medications        Problem List         LOS        Diagnoses and all orders for this visit:    1. Annual physical exam (Primary)    2. Adult ADHD (attention deficit hyperactivity disorder)  -     amphetamine-dextroamphetamine (Adderall) 20 MG tablet; Take 1 tablet by mouth 2 (Two) Times a Day.  Dispense: 60 tablet; Refill: 0    3. Class 1 obesity due to excess calories without serious comorbidity with body mass index (BMI) of 33.0 to 33.9 in adult    4. History of cigarette smoking       Patient's (Body mass index is 33.58 kg/m².) indicates that they are obese (BMI >30) with health related conditions that include none . Weight is worsening. BMI is is above average; BMI management plan is completed. We discussed low calorie, low carb based diet program, portion control, and increasing exercise.       Assessment & Plan  1. Annual physical examination.  His Cologuard test results were negative on 09/15/2022, indicating a need for retesting in September 2025. He has a smoking history of approximately 15 pack-years, he is not interested in lung cancer screening with low-dose CT at this time. He has declined all vaccinations, including the shingles vaccine. His last tetanus shot was administered approximately 15 years ago. He has experienced a weight gain of 10 pounds since September 2024. He was advised to reduce his intake of sweets and high-calorie foods, and to incorporate more vegetables, fruits, whole grains, and lighter foods into his diet. He was also encouraged to engage in regular exercise. He was informed about the option of a low-dose CT scan for lung cancer screening, but he declined. He was advised to receive a tetanus shot when necessary. He was advised to maintain a healthy diet throughout the day to  prevent excessive hunger as the effects of stimulant medication wears off. He was also encouraged to have some low-calorie snacks available, such as fruit to avoid eating junk food from the vending machine.    2. Attention deficit hyperactivity disorder (ADHD).  He is currently on Vyvanse 60 mg, which he reports helps with his appetite for a few hours. He expressed interest in switching back to Adderall due to its longer-lasting effects. A prescription for Adderall 20 mg, to be taken twice daily, was provided. He was advised to take the second dose approximately 5 to 6 hours after the morning dose. He was also advised to save any remaining Vyvanse for potential future use during periods of medication shortage.    Follow-up  The patient will follow up in 3 months.    The patient was counseled regarding nutrition, physical activity, healthy weight, injury prevention, immunizations and preventative health screenings.    No follow-ups on file.      Expected course, medications, and adverse effects discussed.    Call or return if worsening or persistent symptoms.   Hand hygiene was performed before and after exam.   The complete contents of the Assessment and Plan and Data / Lab Results as documented above have been reviewed and addressed by myself with the patient today as part of an ongoing evaluation / treatment plan.    If separate E/M service has been provided today it was significant, medically necessary, and separately identifiable.           Patient or patient representative verbalized consent for the use of Ambient Listening during the visit with  Esther Tafoya MD for chart documentation. 1/13/2025  22:43 EST

## 2025-01-13 ENCOUNTER — OFFICE VISIT (OUTPATIENT)
Dept: FAMILY MEDICINE CLINIC | Facility: CLINIC | Age: 55
End: 2025-01-13
Payer: COMMERCIAL

## 2025-01-13 ENCOUNTER — TELEPHONE (OUTPATIENT)
Dept: FAMILY MEDICINE CLINIC | Facility: CLINIC | Age: 55
End: 2025-01-13

## 2025-01-13 VITALS
OXYGEN SATURATION: 95 % | HEART RATE: 83 BPM | WEIGHT: 227.4 LBS | SYSTOLIC BLOOD PRESSURE: 132 MMHG | DIASTOLIC BLOOD PRESSURE: 86 MMHG | BODY MASS INDEX: 33.68 KG/M2 | RESPIRATION RATE: 18 BRPM | TEMPERATURE: 97.8 F | HEIGHT: 69 IN

## 2025-01-13 DIAGNOSIS — Z00.00 ANNUAL PHYSICAL EXAM: Primary | ICD-10-CM

## 2025-01-13 DIAGNOSIS — F90.9 ADULT ADHD (ATTENTION DEFICIT HYPERACTIVITY DISORDER): ICD-10-CM

## 2025-01-13 DIAGNOSIS — E66.811 CLASS 1 OBESITY DUE TO EXCESS CALORIES WITHOUT SERIOUS COMORBIDITY WITH BODY MASS INDEX (BMI) OF 33.0 TO 33.9 IN ADULT: ICD-10-CM

## 2025-01-13 DIAGNOSIS — Z87.891 HISTORY OF CIGARETTE SMOKING: ICD-10-CM

## 2025-01-13 DIAGNOSIS — E66.09 CLASS 1 OBESITY DUE TO EXCESS CALORIES WITHOUT SERIOUS COMORBIDITY WITH BODY MASS INDEX (BMI) OF 33.0 TO 33.9 IN ADULT: ICD-10-CM

## 2025-01-13 PROCEDURE — 99396 PREV VISIT EST AGE 40-64: CPT | Performed by: FAMILY MEDICINE

## 2025-01-13 PROCEDURE — 99213 OFFICE O/P EST LOW 20 MIN: CPT | Performed by: FAMILY MEDICINE

## 2025-01-13 RX ORDER — DEXTROAMPHETAMINE SACCHARATE, AMPHETAMINE ASPARTATE, DEXTROAMPHETAMINE SULFATE AND AMPHETAMINE SULFATE 5; 5; 5; 5 MG/1; MG/1; MG/1; MG/1
20 TABLET ORAL 2 TIMES DAILY
Qty: 60 TABLET | Refills: 0 | Status: SHIPPED | OUTPATIENT
Start: 2025-01-13

## 2025-01-13 NOTE — TELEPHONE ENCOUNTER
"Attempted to complete PA for Adderall. Per insurance via covermymeds,  \"This request has been approved using information available on the patient's profile.\"  "

## 2025-02-11 ENCOUNTER — TELEPHONE (OUTPATIENT)
Dept: FAMILY MEDICINE CLINIC | Facility: CLINIC | Age: 55
End: 2025-02-11
Payer: COMMERCIAL

## 2025-02-11 NOTE — TELEPHONE ENCOUNTER
"Attempted to complete PA for Vyvanse. Per covermymeds,  \"This request has been approved using information available on the patient's profile.\"  "

## 2025-02-18 DIAGNOSIS — N52.9 ERECTILE DYSFUNCTION, UNSPECIFIED ERECTILE DYSFUNCTION TYPE: ICD-10-CM

## 2025-02-18 DIAGNOSIS — F90.9 ADULT ADHD (ATTENTION DEFICIT HYPERACTIVITY DISORDER): ICD-10-CM

## 2025-02-18 RX ORDER — TADALAFIL 5 MG/1
TABLET ORAL
Qty: 30 TABLET | Refills: 2 | Status: SHIPPED | OUTPATIENT
Start: 2025-02-18

## 2025-02-18 RX ORDER — DEXTROAMPHETAMINE SACCHARATE, AMPHETAMINE ASPARTATE, DEXTROAMPHETAMINE SULFATE AND AMPHETAMINE SULFATE 5; 5; 5; 5 MG/1; MG/1; MG/1; MG/1
20 TABLET ORAL 2 TIMES DAILY
Qty: 60 TABLET | Refills: 0 | Status: SHIPPED | OUTPATIENT
Start: 2025-02-18

## 2025-02-27 DIAGNOSIS — B00.2 ORAL HERPES: ICD-10-CM

## 2025-02-28 RX ORDER — VALACYCLOVIR HYDROCHLORIDE 1 G/1
TABLET, FILM COATED ORAL
Qty: 12 TABLET | Refills: 1 | Status: SHIPPED | OUTPATIENT
Start: 2025-02-28

## 2025-03-17 DIAGNOSIS — F90.9 ADULT ADHD (ATTENTION DEFICIT HYPERACTIVITY DISORDER): ICD-10-CM

## 2025-03-17 RX ORDER — DEXTROAMPHETAMINE SACCHARATE, AMPHETAMINE ASPARTATE, DEXTROAMPHETAMINE SULFATE AND AMPHETAMINE SULFATE 5; 5; 5; 5 MG/1; MG/1; MG/1; MG/1
20 TABLET ORAL 2 TIMES DAILY
Qty: 60 TABLET | Refills: 0 | Status: SHIPPED | OUTPATIENT
Start: 2025-03-17

## 2025-04-08 ENCOUNTER — OFFICE VISIT (OUTPATIENT)
Dept: FAMILY MEDICINE CLINIC | Facility: CLINIC | Age: 55
End: 2025-04-08
Payer: COMMERCIAL

## 2025-04-08 VITALS
WEIGHT: 226 LBS | OXYGEN SATURATION: 99 % | DIASTOLIC BLOOD PRESSURE: 72 MMHG | HEART RATE: 86 BPM | HEIGHT: 68 IN | SYSTOLIC BLOOD PRESSURE: 136 MMHG | TEMPERATURE: 98.6 F | BODY MASS INDEX: 34.25 KG/M2 | RESPIRATION RATE: 18 BRPM

## 2025-04-08 DIAGNOSIS — Z87.891 HISTORY OF CIGARETTE SMOKING: ICD-10-CM

## 2025-04-08 DIAGNOSIS — K21.9 GASTROESOPHAGEAL REFLUX DISEASE, UNSPECIFIED WHETHER ESOPHAGITIS PRESENT: ICD-10-CM

## 2025-04-08 DIAGNOSIS — G47.30 SLEEP APNEA IN ADULT: ICD-10-CM

## 2025-04-08 DIAGNOSIS — E66.811 CLASS 1 OBESITY WITH BODY MASS INDEX (BMI) OF 34.0 TO 34.9 IN ADULT, UNSPECIFIED OBESITY TYPE, UNSPECIFIED WHETHER SERIOUS COMORBIDITY PRESENT: ICD-10-CM

## 2025-04-08 DIAGNOSIS — F90.9 ADULT ADHD (ATTENTION DEFICIT HYPERACTIVITY DISORDER): Primary | ICD-10-CM

## 2025-04-08 PROCEDURE — 99214 OFFICE O/P EST MOD 30 MIN: CPT | Performed by: FAMILY MEDICINE

## 2025-04-08 RX ORDER — DEXTROAMPHETAMINE SACCHARATE, AMPHETAMINE ASPARTATE, DEXTROAMPHETAMINE SULFATE AND AMPHETAMINE SULFATE 5; 5; 5; 5 MG/1; MG/1; MG/1; MG/1
20 TABLET ORAL 2 TIMES DAILY
Qty: 60 TABLET | Refills: 0 | Status: SHIPPED | OUTPATIENT
Start: 2025-04-08

## 2025-04-08 NOTE — PROGRESS NOTES
Chief Complaint  ADHD    History of Present Illness  Bryan Rodgers presents today for follow up on ADHD    ADHD:  Symptoms include inattention, forgetfulness, and avoiding mental tasks. The symptoms occur at home, at work, during social events, in the morning, in the afternoon, in the evening, and on weekends. Onset was since about 5-10 years ago. The symptoms are described as moderate in severity. The symptoms are described as stable. Symptoms are exacerbated by work environment, distracting activities, and mental effort. Symptoms are relieved by stimulant medication. Associated symptoms include memory disorder. Current treatment includes adderall 20 mg. By report, Bryan is compliant most of the time.     History of Present Illness  The patient is a 54-year-old male who presents for follow-up on ADHD. He has been taking Adderall 20 mg twice daily with improvement in concentration and work efficacy.    He has discontinued the use of Vyvanse, opting for Adderall due to its perceived superior efficacy. He retains some Vyvanse as a contingency measure. He anticipates no issues with medication refills during his upcoming trip to [place], scheduled from Friday to the 21st.    He reports persistent acid reflux symptoms, which have recently escalated over the past 6 months. He suspects potential erosion or ulceration. He describes experiencing severe pain upon swallowing food, akin to a raw sensation. He has been managing these symptoms with over-the-counter Nexium 20 mg, taken every other day, which provides temporary relief. He has consumed two bottles of Nexium to date. He has not taken Nexium for the past few weeks but resumed it a few days ago due to a recurrence of symptoms, although less severe than previously. He also uses Tums for indigestion at work. He first experienced significant pain in 07/2024, which occurred daily until he started Nexium treatment. Prior to this, he had attempted to manage the symptoms with  Tums, which provided only minimal relief. He has a family history of esophageal cancer in his uncle, who underwent chemotherapy and surgery but ultimately succumbed to pneumonia. He has not undergone gallbladder surgery or any abdominal CT scans. He notes that lying on his left side after eating prevents acid reflux, but turning to his right side triggers it.    He was referred for a sleep study in 2024 but has not yet received a call to schedule the appointment. He was diagnosed with sleep apnea in  and had moderate sleep apnea at that time. He could not afford the CPAP machine due to insurance issues. His girlfriend has expressed concern about his breathing patterns during sleep.    He has not been monitoring his blood pressure at home or elsewhere. His blood pressure is slightly elevated at 136 systolic, which is not a cause for concern. He had dental work done earlier today, including a cavity and a chipped tooth, and used an energy drink to stay awake.    FAMILY HISTORY  His uncle  from esophageal cancer.    MEDICATIONS  Current: Adderall, Nexium, Tums  Past: Vyvanse      Patient Care Team:  Esther Tafoya MD as PCP - General (Family Medicine)   Current Outpatient Medications on File Prior to Visit   Medication Sig    tadalafil (Cialis) 5 MG tablet 2-4 daily as needed for ED    valACYclovir (Valtrex) 1000 MG tablet 2 at 1st onset of symptoms and repeat in 12 hours for 2 doses.    albuterol sulfate  (90 Base) MCG/ACT inhaler 1 to 2 puffs inhalation every 4-6 hours as needed for cough and shortness of breath (Patient not taking: Reported on 3/12/2024)    azelastine (ASTEPRO) 0.15 % solution nasal spray 2 sprays into the nostril(s) as directed by provider Daily. (Patient not taking: Reported on 2024)    olopatadine (PATANOL) 0.1 % ophthalmic solution Administer 1 drop to both eyes 2 (Two) Times a Day. (Patient not taking: Reported on 2024)     No current facility-administered  "medications on file prior to visit.       Objective   Vital Signs:   /72 (BP Location: Right arm, Patient Position: Sitting, Cuff Size: Large Adult)   Pulse 86   Temp 98.6 °F (37 °C) (Temporal)   Resp 18   Ht 172.7 cm (68\")   Wt 103 kg (226 lb)   SpO2 99%   BMI 34.36 kg/m²    BP Readings from Last 3 Encounters:   04/08/25 136/72   01/13/25 132/86   09/04/24 132/80     Wt Readings from Last 3 Encounters:   04/08/25 103 kg (226 lb)   01/13/25 103 kg (227 lb 6.4 oz)   09/04/24 98.4 kg (217 lb)         Physical Exam  Vitals and nursing note reviewed.   Constitutional:       General: He is not in acute distress.     Appearance: He is well-developed.   HENT:      Head:      Comments: Bilateral facial swelling due to local dental anesthesia earlier today.  Eyes:      Conjunctiva/sclera: Conjunctivae normal.      Pupils: Pupils are equal, round, and reactive to light.   Neck:      Comments: No thyromegaly or palpable thyroid masses  Cardiovascular:      Rate and Rhythm: Normal rate and regular rhythm.      Heart sounds: No murmur heard.  Pulmonary:      Effort: Pulmonary effort is normal.      Breath sounds: No wheezing.   Abdominal:      General: Abdomen is flat. Bowel sounds are normal.      Tenderness: There is no abdominal tenderness.   Musculoskeletal:         General: Normal range of motion.      Cervical back: Normal range of motion and neck supple.   Skin:     General: Skin is warm and dry.   Neurological:      Mental Status: He is alert and oriented to person, place, and time.   Psychiatric:         Mood and Affect: Mood normal.         Behavior: Behavior normal.          Physical Exam  Neck was examined.  Lungs were auscultated.  Abdomen was examined.    Vital Signs  Blood pressure is 136 systolic.      No visits with results within 1 Day(s) from this visit.   Latest known visit with results is:   Office Visit on 12/14/2023   Component Date Value Ref Range Status    SARS Antigen 12/14/2023 Not " Detected  Not Detected, Presumptive Negative Final    Influenza A Antigen SCOTTY 12/14/2023 Not Detected  Not Detected Final    Influenza B Antigen SCOTTY 12/14/2023 Not Detected  Not Detected Final    Internal Control 12/14/2023 Passed  Passed Final    Lot Number 12/14/2023 [id number]   Final    Expiration Date 12/14/2023 11/2,024   Final    Rapid Strep A Screen 12/14/2023 Negative  Negative, VALID, INVALID, Not Performed Final    Internal Control 12/14/2023 Passed  Passed Final    Lot Number 12/14/2023 [id number]   Final    Expiration Date 12/14/2023 1/2,025   Final    RSV Rapid Ag 12/14/2023 Negative  Negative Final    Lot Number 12/14/2023 [id number]   Final    Expiration Date 12/14/2023 10/2,024   Final       Lab Results   Component Value Date    CHLPL 197 11/02/2023    TRIG 129 11/02/2023    HDL 37 (L) 11/02/2023     (H) 11/02/2023     Lab Results   Component Value Date    TSH 3.340 05/10/2022     Lab Results   Component Value Date    GLUCOSE 93 11/02/2023    BUN 10 11/02/2023    CREATININE 0.94 11/02/2023    EGFRIFNONA 92 04/20/2018    EGFRIFAFRI 106 04/20/2018    BCR 11 11/02/2023    K 4.2 11/02/2023    CO2 26 11/02/2023    CALCIUM 9.3 11/02/2023    ALBUMIN 4.4 11/02/2023    AST 21 11/02/2023    ALT 33 11/02/2023     Lab Results   Component Value Date    WBC 8.3 04/20/2018    HGB 16.0 04/20/2018    HCT 45.5 04/20/2018    MCV 90.1 04/20/2018     04/20/2018             Results               Assessment and Plan    Diagnoses and all orders for this visit:    1. Adult ADHD (attention deficit hyperactivity disorder) (Primary)  -     amphetamine-dextroamphetamine (Adderall) 20 MG tablet; Take 1 tablet by mouth 2 (Two) Times a Day.  Dispense: 60 tablet; Refill: 0    2. Class 1 obesity with body mass index (BMI) of 34.0 to 34.9 in adult, unspecified obesity type, unspecified whether serious comorbidity present    3. History of cigarette smoking    4. Sleep apnea in adult  -     Ambulatory Referral to Sleep  Medicine    5. Gastroesophageal reflux disease, unspecified whether esophagitis present  -     esomeprazole (nexIUM) 20 MG capsule; Take 1 capsule by mouth Every Morning Before Breakfast.  Dispense: 90 capsule; Refill: 0        Assessment & Plan  1. Attention deficit hyperactivity disorder (ADHD).  He has been taking Adderall 20 mg twice daily with improvement in concentration and work efficacy. A prescription refill for Adderall has been provided. He is advised to continue using Vyvanse as a backup if there are any delays in getting the Adderall prescription filled.    2. Gastroesophageal reflux disease (GERD).  He reports experiencing significant pain and erosion symptoms over the past six months. He has been taking Nexium 20 mg intermittently, which has provided some relief. He is advised to start a daily regimen of Nexium 20 mg for the next 3 months. If symptoms persist, he may take Pepcid or Tums in addition to Nexium. If additional medications are needed, the Nexium dosage may be increased to 40 mg. He is also advised to avoid triggers such as stress, lack of sleep, spicy foods, and tight clothing, and to avoid eating within 2 hours of lying down. Weight loss is recommended to help alleviate symptoms. A prescription for Nexium 20 mg will be sent to [facility]. If symptoms persist beyond 3 months, a referral to a gastroenterologist will be considered for further evaluation, including a possible EGD and colonoscopy.    3. Sleep apnea.  He was diagnosed with moderate sleep apnea in 2006 but could not afford a CPAP machine at that time. A new referral to sleep medicine will be placed to reassess his condition and explore treatment options.    4. Elevated blood pressure.  His blood pressure is slightly elevated at 136 systolic. He is advised to monitor his blood pressure regularly at home.    Follow-up  The patient will follow up in 4 months.      Medications Discontinued During This Encounter   Medication Reason     lisdexamfetamine (Vyvanse) 60 MG capsule Alternate therapy    amphetamine-dextroamphetamine (Adderall) 20 MG tablet Reorder         Follow Up     Return in about 4 months (around 8/8/2025) for adhd and reflux.    Patient was given instructions and counseling regarding his condition or for health maintenance advice. Please see specific information pulled into the AVS if appropriate.     Patient or patient representative verbalized consent for the use of Ambient Listening during the visit with  Esther Tafoya MD for chart documentation. 4/19/2025  21:20 EDT

## 2025-04-24 DIAGNOSIS — F90.9 ADULT ADHD (ATTENTION DEFICIT HYPERACTIVITY DISORDER): ICD-10-CM

## 2025-04-25 RX ORDER — DEXTROAMPHETAMINE SACCHARATE, AMPHETAMINE ASPARTATE, DEXTROAMPHETAMINE SULFATE AND AMPHETAMINE SULFATE 5; 5; 5; 5 MG/1; MG/1; MG/1; MG/1
20 TABLET ORAL 2 TIMES DAILY
Qty: 60 TABLET | Refills: 0 | Status: SHIPPED | OUTPATIENT
Start: 2025-04-25

## 2025-05-13 DIAGNOSIS — N52.9 ERECTILE DYSFUNCTION, UNSPECIFIED ERECTILE DYSFUNCTION TYPE: ICD-10-CM

## 2025-05-14 RX ORDER — TADALAFIL 5 MG/1
TABLET ORAL
Qty: 30 TABLET | Refills: 12 | Status: SHIPPED | OUTPATIENT
Start: 2025-05-14

## 2025-05-20 DIAGNOSIS — F90.9 ADULT ADHD (ATTENTION DEFICIT HYPERACTIVITY DISORDER): ICD-10-CM

## 2025-05-20 RX ORDER — DEXTROAMPHETAMINE SACCHARATE, AMPHETAMINE ASPARTATE, DEXTROAMPHETAMINE SULFATE AND AMPHETAMINE SULFATE 5; 5; 5; 5 MG/1; MG/1; MG/1; MG/1
20 TABLET ORAL 2 TIMES DAILY
Qty: 60 TABLET | Refills: 0 | Status: SHIPPED | OUTPATIENT
Start: 2025-05-20

## 2025-07-01 DIAGNOSIS — F90.9 ADULT ADHD (ATTENTION DEFICIT HYPERACTIVITY DISORDER): ICD-10-CM

## 2025-07-02 RX ORDER — DEXTROAMPHETAMINE SACCHARATE, AMPHETAMINE ASPARTATE, DEXTROAMPHETAMINE SULFATE AND AMPHETAMINE SULFATE 5; 5; 5; 5 MG/1; MG/1; MG/1; MG/1
20 TABLET ORAL 2 TIMES DAILY
Qty: 60 TABLET | Refills: 0 | Status: SHIPPED | OUTPATIENT
Start: 2025-07-02

## 2025-08-04 DIAGNOSIS — F90.9 ADULT ADHD (ATTENTION DEFICIT HYPERACTIVITY DISORDER): ICD-10-CM

## 2025-08-04 RX ORDER — DEXTROAMPHETAMINE SACCHARATE, AMPHETAMINE ASPARTATE, DEXTROAMPHETAMINE SULFATE AND AMPHETAMINE SULFATE 5; 5; 5; 5 MG/1; MG/1; MG/1; MG/1
20 TABLET ORAL 2 TIMES DAILY
Qty: 60 TABLET | Refills: 0 | Status: SHIPPED | OUTPATIENT
Start: 2025-08-04

## 2025-08-12 ENCOUNTER — OFFICE VISIT (OUTPATIENT)
Dept: FAMILY MEDICINE CLINIC | Facility: CLINIC | Age: 55
End: 2025-08-12
Payer: COMMERCIAL

## 2025-08-12 VITALS
WEIGHT: 217.4 LBS | OXYGEN SATURATION: 98 % | TEMPERATURE: 98.6 F | SYSTOLIC BLOOD PRESSURE: 128 MMHG | DIASTOLIC BLOOD PRESSURE: 82 MMHG | HEART RATE: 93 BPM | BODY MASS INDEX: 32.95 KG/M2 | RESPIRATION RATE: 18 BRPM | HEIGHT: 68 IN

## 2025-08-12 DIAGNOSIS — K21.9 GASTROESOPHAGEAL REFLUX DISEASE, UNSPECIFIED WHETHER ESOPHAGITIS PRESENT: ICD-10-CM

## 2025-08-12 DIAGNOSIS — E66.09 CLASS 1 OBESITY DUE TO EXCESS CALORIES WITHOUT SERIOUS COMORBIDITY WITH BODY MASS INDEX (BMI) OF 33.0 TO 33.9 IN ADULT: ICD-10-CM

## 2025-08-12 DIAGNOSIS — F90.9 ADULT ADHD (ATTENTION DEFICIT HYPERACTIVITY DISORDER): Primary | ICD-10-CM

## 2025-08-12 DIAGNOSIS — Z12.11 SCREENING FOR COLON CANCER: ICD-10-CM

## 2025-08-12 DIAGNOSIS — Z87.891 FORMER SMOKER: ICD-10-CM

## 2025-08-12 DIAGNOSIS — E66.811 CLASS 1 OBESITY DUE TO EXCESS CALORIES WITHOUT SERIOUS COMORBIDITY WITH BODY MASS INDEX (BMI) OF 33.0 TO 33.9 IN ADULT: ICD-10-CM

## 2025-08-12 RX ORDER — DEXTROAMPHETAMINE SACCHARATE, AMPHETAMINE ASPARTATE, DEXTROAMPHETAMINE SULFATE AND AMPHETAMINE SULFATE 5; 5; 5; 5 MG/1; MG/1; MG/1; MG/1
20 TABLET ORAL 2 TIMES DAILY
Qty: 60 TABLET | Refills: 0 | Status: CANCELLED | OUTPATIENT
Start: 2025-08-12

## 2025-08-12 RX ORDER — FAMOTIDINE 20 MG/1
TABLET, FILM COATED ORAL
Qty: 60 TABLET | Refills: 3 | Status: SHIPPED | OUTPATIENT
Start: 2025-08-12